# Patient Record
Sex: MALE | Race: WHITE | Employment: OTHER | URBAN - METROPOLITAN AREA
[De-identification: names, ages, dates, MRNs, and addresses within clinical notes are randomized per-mention and may not be internally consistent; named-entity substitution may affect disease eponyms.]

---

## 2017-01-04 ENCOUNTER — NURSE ONLY (OUTPATIENT)
Dept: INTERNAL MEDICINE CLINIC | Facility: CLINIC | Age: 67
End: 2017-01-04

## 2017-01-04 DIAGNOSIS — Z00.00 LABORATORY EXAMINATION ORDERED AS PART OF A ROUTINE GENERAL MEDICAL EXAMINATION: Primary | ICD-10-CM

## 2017-01-04 LAB
APPEARANCE: CLEAR
BILIRUBIN: NEGATIVE
GLUCOSE (URINE DIPSTICK): NEGATIVE MG/DL
KETONES (URINE DIPSTICK): NEGATIVE MG/DL
LEUKOCYTES: NEGATIVE
MULTISTIX LOT#: NORMAL NUMERIC
NITRITE, URINE: NEGATIVE
OCCULT BLOOD: NEGATIVE
PH, URINE: 7 (ref 4.5–8)
PROTEIN (URINE DIPSTICK): NEGATIVE MG/DL
SPECIFIC GRAVITY: 1.02 (ref 1–1.03)
URINE-COLOR: YELLOW
UROBILINOGEN,SEMI-QN: 0.2 MG/DL (ref 0–1.9)

## 2017-01-04 PROCEDURE — 94010 BREATHING CAPACITY TEST: CPT | Performed by: INTERNAL MEDICINE

## 2017-01-04 PROCEDURE — 81003 URINALYSIS AUTO W/O SCOPE: CPT | Performed by: INTERNAL MEDICINE

## 2017-01-04 PROCEDURE — 93000 ELECTROCARDIOGRAM COMPLETE: CPT | Performed by: INTERNAL MEDICINE

## 2017-01-04 PROCEDURE — 99173 VISUAL ACUITY SCREEN: CPT | Performed by: INTERNAL MEDICINE

## 2017-01-04 PROCEDURE — 92551 PURE TONE HEARING TEST AIR: CPT | Performed by: INTERNAL MEDICINE

## 2017-01-04 NOTE — PROGRESS NOTES
*LOOKIN' BODY RESULTS    YES: X      NO:       REASON TEST NOT PERFORMED:        HEIGHT: 5'6.5   WEIGHT: 158  _____________________________________________________________________________  *VENIPUNCTURE    YES:  X     NO:        REASON VENIPUNCTURE NOT PER

## 2017-01-17 NOTE — PROGRESS NOTES
HEALTH MAINTENANCE:        Immunization History  Administered            Date(s) Administered    Influenza             10/21/2008      Influenza Vaccine, No Preserv, 3YR +                          12/09/2016      TDAP                  01/25/2007      Dallin   URINE-COLOR YELLOW Yellow Final 01/04/2017  9:40 AM Unknown     Multistix Lot# [id number] Numeric Final 01/04/2017  9:40 AM Unknown     Multistix Expiration Date 6/2017 Date Final 01/04/2017  9:40 AM Unknown           ______________________________________

## 2017-01-18 ENCOUNTER — OFFICE VISIT (OUTPATIENT)
Dept: INTERNAL MEDICINE CLINIC | Facility: CLINIC | Age: 67
End: 2017-01-18

## 2017-01-18 VITALS
HEART RATE: 60 BPM | RESPIRATION RATE: 12 BRPM | DIASTOLIC BLOOD PRESSURE: 82 MMHG | SYSTOLIC BLOOD PRESSURE: 130 MMHG | HEIGHT: 66.5 IN | WEIGHT: 158 LBS | BODY MASS INDEX: 25.09 KG/M2 | OXYGEN SATURATION: 98 % | TEMPERATURE: 98 F

## 2017-01-18 DIAGNOSIS — I72.0 ANEURYSM OF CAROTID ARTERY (HCC): ICD-10-CM

## 2017-01-18 DIAGNOSIS — I51.5 CARDIAC CALCIFICATION (HCC): ICD-10-CM

## 2017-01-18 DIAGNOSIS — Z00.00 ROUTINE GENERAL MEDICAL EXAMINATION AT A HEALTH CARE FACILITY: Primary | ICD-10-CM

## 2017-01-18 DIAGNOSIS — I72.9 ANEURYSM (HCC): ICD-10-CM

## 2017-01-18 DIAGNOSIS — E78.5 ELEVATED LIPIDS: ICD-10-CM

## 2017-01-18 DIAGNOSIS — D12.6 ADENOMATOUS POLYP OF COLON, UNSPECIFIED PART OF COLON: ICD-10-CM

## 2017-01-18 DIAGNOSIS — E83.110 HEREDITARY HEMOCHROMATOSIS (HCC): ICD-10-CM

## 2017-01-18 DIAGNOSIS — K82.4 GALLBLADDER POLYP: ICD-10-CM

## 2017-01-18 DIAGNOSIS — E55.9 VITAMIN D DEFICIENCY: ICD-10-CM

## 2017-01-18 DIAGNOSIS — I77.71 CAROTID ARTERY DISSECTION (HCC): ICD-10-CM

## 2017-01-18 DIAGNOSIS — N40.0 BENIGN NON-NODULAR PROSTATIC HYPERPLASIA, PRESENCE OF LOWER URINARY TRACT SYMPTOMS UNSPECIFIED: ICD-10-CM

## 2017-01-18 DIAGNOSIS — K80.20 GALLBLADDER STONE WITHOUT CHOLECYSTITIS OR OBSTRUCTION: ICD-10-CM

## 2017-01-18 DIAGNOSIS — Z56.6 STRESS AT WORK: ICD-10-CM

## 2017-01-18 DIAGNOSIS — J30.1 HAYFEVER: ICD-10-CM

## 2017-01-18 DIAGNOSIS — I10 ESSENTIAL HYPERTENSION: ICD-10-CM

## 2017-01-18 DIAGNOSIS — R73.9 HYPERGLYCEMIA: ICD-10-CM

## 2017-01-18 DIAGNOSIS — E04.1 THYROID NODULE: ICD-10-CM

## 2017-01-18 DIAGNOSIS — K57.90 DIVERTICULOSIS OF INTESTINE WITHOUT BLEEDING, UNSPECIFIED INTESTINAL TRACT LOCATION: ICD-10-CM

## 2017-01-18 PROCEDURE — 99396 PREV VISIT EST AGE 40-64: CPT | Performed by: INTERNAL MEDICINE

## 2017-01-18 SDOH — HEALTH STABILITY - MENTAL HEALTH: OTHER PHYSICAL AND MENTAL STRAIN RELATED TO WORK: Z56.6

## 2017-01-18 NOTE — PROGRESS NOTES
HPI:    Patient ID: Cirilo Perez is a 61year old male. HPI DEAR Alcon Prince    IT WAS NICE SEEING YOU FOR YOUR WELLNESS VISIT ON 1/18/2017            Review of Systems   HPI:    Patient ID: Cirilo Perez is a 61year old male.     History of Present Illness Have you had any memory issues?: 0-No    Fall/Risk Scoring: 3          PHQ-4: Over the LAST 2 WEEKS               Little interest or pleasure in doing things (over the last two weeks)?: Not at all    Feeling down, depressed, or hopeless (over the last Urinary stream normal.  Denies history of kidney stones. Nocturia times:X0     NO-   ED   -    Musculoskeletal: Negative for myalgias, back pain, joint swelling, and arthralgias.   chronic back issues have been excellent for at least several years had seen Height: 66.5\"   Weight: 158 lb   SpO2: 98%     Body mass index is 25.12 kg/(m^2).     Active Problems:  Patient Active Problem List:     HTN (hypertension)     Hemochromatosis     Other and unspecified hyperlipidemia     BPH (benign prostatic hypertrophy 9/28/2011   • Left atrial enlargement 3/3/2011   • Thyroid nodule 12/27/2011   • Mitral insufficiency 3/10/2009   • Hayfever    • History of chickenpox    • Sensorineural hearing loss 1/25/2007     low grade   • High blood pressure    • High cholesterol Alcohol Use: Yes  0.0 oz/week    0 Standard drinks or equivalent per week         Comment: BEER   7  CANS  WEEKLY    Drug Use: No    Sexual Activity: No   No  Other Topics Concern    Caffeine Concern No    Comment: 6 decaf cups a day    Stress Concern Yes normal.  Cognitive testing normal.    Skin: Cannot rule out actinic keratosis shoulder area . skin is warm and dry. No rash noted. No erythema. No pallor. No obvious worrisome skin lesions. Psychiatric: Normal mood and affect. Appropriate.   Overall bett should get Prevnar 13 for pneumonia coverage and repeat 1 year later with Pneumovax–23       4.  carotid dissection early November 2016 presented with significant neurologic findings a facial weakness. Emergent stents ×3 placed in left carotid artery.   Ex but asymptomatic PSA variation is normal at present time. 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 Katie Parson. Last checked fall 2015 believe he checks you every 2-3 years. #14 work stress all consuming but managing better at the present time.   Remember colon, unspecified part of colon  Vitamin d deficiency  Gallbladder stone without cholecystitis or obstruction  Diverticulosis of intestine without bleeding, unspecified intestinal tract location  Thyroid nodule  Hayfever    No orders of the defined types

## 2017-01-27 ENCOUNTER — TELEPHONE (OUTPATIENT)
Dept: INTERNAL MEDICINE CLINIC | Facility: CLINIC | Age: 67
End: 2017-01-27

## 2017-01-27 RX ORDER — ATORVASTATIN CALCIUM 40 MG/1
40 TABLET, FILM COATED ORAL NIGHTLY
Qty: 90 TABLET | Refills: 3 | Status: SHIPPED | OUTPATIENT
Start: 2017-01-27 | End: 2017-03-06

## 2017-01-27 NOTE — TELEPHONE ENCOUNTER
Patient states he needs refill on Atorvastatin due to dose increase from 20mg to 40mg. However [facility] in [place] won't refill because they have not received order reflecting dose increase.

## 2017-02-03 ENCOUNTER — MED REC SCAN ONLY (OUTPATIENT)
Dept: INTERNAL MEDICINE CLINIC | Facility: CLINIC | Age: 67
End: 2017-02-03

## 2017-02-22 ENCOUNTER — TELEPHONE (OUTPATIENT)
Dept: NEUROLOGY | Facility: CLINIC | Age: 67
End: 2017-02-22

## 2017-02-23 ENCOUNTER — TELEPHONE (OUTPATIENT)
Dept: INTERNAL MEDICINE CLINIC | Facility: CLINIC | Age: 67
End: 2017-02-23

## 2017-02-23 NOTE — TELEPHONE ENCOUNTER
William Miles was told at his physical on 1/18 that he should have a f/u ultrasound of this thyroid through Dr. Corazon Galdamez. He also has an order for a CAT scan of his neck from the doctor who previously placed a stent.   William Miles wants to know if he can have his thyroid sca

## 2017-02-28 ENCOUNTER — TELEPHONE (OUTPATIENT)
Dept: NEUROLOGY | Facility: CLINIC | Age: 67
End: 2017-02-28

## 2017-02-28 DIAGNOSIS — G45.1 CAROTID ARTERY SYNDROME: ICD-10-CM

## 2017-02-28 DIAGNOSIS — I77.71 CAROTID ARTERY DISSECTION (HCC): Primary | ICD-10-CM

## 2017-02-28 NOTE — TELEPHONE ENCOUNTER
Patient calling, he is due for CTA follow up and no orders in system. Per last OV note, patient to have CTA head and neck done late Jan/early FEB    Order placed per protocol.  Reminded patient to schedule follow up with Dr. Dominga Antunez

## 2017-03-01 ENCOUNTER — TELEPHONE (OUTPATIENT)
Dept: NEUROLOGY | Facility: CLINIC | Age: 67
End: 2017-03-01

## 2017-03-01 NOTE — TELEPHONE ENCOUNTER
Spoke to Ines at [facility] [place] ,[employer] [id number] no Mendel Grand is needed. [employer]#[phone] call time 24:41    Called pt at work his co worker answer left a message with him to call us back. If pt calls please give him message above.

## 2017-03-02 ENCOUNTER — HOSPITAL ENCOUNTER (OUTPATIENT)
Dept: CT IMAGING | Facility: HOSPITAL | Age: 67
Discharge: HOME | End: 2017-03-02
Attending: RADIOLOGY
Payer: COMMERCIAL

## 2017-03-02 DIAGNOSIS — I77.71 CAROTID ARTERY DISSECTION (HCC): ICD-10-CM

## 2017-03-02 DIAGNOSIS — G45.1 CAROTID ARTERY SYNDROME: ICD-10-CM

## 2017-03-02 PROCEDURE — 70498 CT ANGIOGRAPHY NECK: CPT

## 2017-03-02 PROCEDURE — 70496 CT ANGIOGRAPHY HEAD: CPT

## 2017-03-06 ENCOUNTER — OFFICE VISIT (OUTPATIENT)
Dept: SURGERY | Facility: CLINIC | Age: 67
End: 2017-03-06

## 2017-03-06 VITALS — DIASTOLIC BLOOD PRESSURE: 76 MMHG | SYSTOLIC BLOOD PRESSURE: 124 MMHG | HEART RATE: 64 BPM | RESPIRATION RATE: 18 BRPM

## 2017-03-06 DIAGNOSIS — I77.71 CAROTID ARTERY DISSECTION (HCC): Primary | ICD-10-CM

## 2017-03-06 PROCEDURE — 99214 OFFICE O/P EST MOD 30 MIN: CPT | Performed by: RADIOLOGY

## 2017-03-06 NOTE — PROGRESS NOTES
3/6/2017      Dear Tyree Ames,  I had the pleasure of seeing Mortimer Braeden today,3/6/2017   , for a 4 month follow-up status post emergent revascularization of a dissected, occluded left distal cervical internal carotid artery, causing left cerebral ischemia Recurrent UTI 4/6/2010   • BPH (benign prostatic hypertrophy)    • Hyperglycemia    • Mitral regurgitation 4/17/2012     3/9/2011: 2D echo did show mild aterioir lead jet, MR   • Syncope 3/2/2011     vagal episode   • Closed head injury 3/2/2011   • PVC's 11/3/2016    Comment         Drug Use: No          Current Outpatient Prescriptions:  BENAZEPRIL HCL 20 MG Oral Tab TAKE 1 TABLET BY MOUTH EVERY DAY Disp: 30 tablet Rfl: 6   AMLODIPINE BESYLATE 2.5 MG Oral Tab TAKE 1 TABLET BY MOUTH EVERY DAY Disp: 30 tab endarterectomy is again noted.   Neck/Thyroid: no JVD, adenopathy or bruit, trachea midline  Lungs: clear, equal bilaterally, no wheezing  Cardiovascular: regular rate and rhythm without murmur  Skin: warm, dry and intact  Neurological:  Mental status: Norm with annual or biannual carotid Dopplers. I would like us to get him on a unified schedule so he is not getting more examinations then he needs. For the next 3 years, annual Dopplers are recommended. No Follow-up on file.

## 2017-03-06 NOTE — PATIENT INSTRUCTIONS
Refill policies:    • Allow 2 business days for refills; controlled substances may take longer.   • Contact your pharmacy at least 5 days prior to running out of medication and have them send an electronic request or submit request through the “request re your physician has recommended that you have a procedure or additional testing performed. [facility] will contact your insurance carrier to obtain pre-certification or prior authorization.     Unfortunately, SARAH has seen an increas

## 2017-03-06 NOTE — PROGRESS NOTES
Patient following up after completing CTA brain/neck.     Review of Systems:    Hand Dominance: right  General: no symptoms reported  Neuro: no symptoms reported  Head: no symptoms reported  Musculoskeletal: no symptoms reported  Cardiovascular: no symptoms

## 2017-03-15 ENCOUNTER — PRIOR ORIGINAL RECORDS (OUTPATIENT)
Dept: OTHER | Age: 67
End: 2017-03-15

## 2017-03-15 ENCOUNTER — APPOINTMENT (OUTPATIENT)
Dept: LAB | Age: 67
End: 2017-03-15
Attending: INTERNAL MEDICINE
Payer: COMMERCIAL

## 2017-03-15 DIAGNOSIS — E83.110 HEREDITARY HEMOCHROMATOSIS (HCC): ICD-10-CM

## 2017-03-15 LAB
DEPRECATED HBV CORE AB SER IA-ACNC: 16.5 NG/ML (ref 22–322)
ERYTHROCYTE [DISTWIDTH] IN BLOOD BY AUTOMATED COUNT: 12.6 % (ref 11.5–16)
HCT VFR BLD AUTO: 48 % (ref 37–53)
HGB BLD-MCNC: 16.7 G/DL (ref 13–17)
IRON SATURATION: 21 % (ref 13–45)
IRON: 96 UG/DL (ref 45–182)
MCH RBC QN AUTO: 32 PG (ref 27–33.2)
MCHC RBC AUTO-ENTMCNC: 34.8 G/DL (ref 31–37)
MCV RBC AUTO: 92 FL (ref 80–99)
PLATELET # BLD AUTO: 228 10(3)UL (ref 150–450)
RBC # BLD AUTO: 5.22 X10(6)UL (ref 4.3–5.7)
RED CELL DISTRIBUTION WIDTH-SD: 42.5 FL (ref 35.1–46.3)
TOTAL IRON BINDING CAPACITY: 460 UG/DL (ref 298–536)
TRANSFERRIN: 309 MG/DL (ref 200–360)
WBC # BLD AUTO: 5.3 X10(3) UL (ref 4–13)

## 2017-03-15 PROCEDURE — 85027 COMPLETE CBC AUTOMATED: CPT

## 2017-03-15 PROCEDURE — 36415 COLL VENOUS BLD VENIPUNCTURE: CPT

## 2017-03-15 PROCEDURE — 82728 ASSAY OF FERRITIN: CPT

## 2017-03-15 PROCEDURE — 83540 ASSAY OF IRON: CPT

## 2017-03-15 PROCEDURE — 83550 IRON BINDING TEST: CPT

## 2017-05-09 ENCOUNTER — TELEPHONE (OUTPATIENT)
Dept: INTERNAL MEDICINE CLINIC | Facility: CLINIC | Age: 67
End: 2017-05-09

## 2017-05-09 ENCOUNTER — PRIOR ORIGINAL RECORDS (OUTPATIENT)
Dept: OTHER | Age: 67
End: 2017-05-09

## 2017-05-09 NOTE — TELEPHONE ENCOUNTER
Left message for patient. Need location of [facility] in order to fax over requisition for therapeutic phlebotomy.

## 2017-05-10 ENCOUNTER — PRIOR ORIGINAL RECORDS (OUTPATIENT)
Dept: OTHER | Age: 67
End: 2017-05-10

## 2017-05-10 NOTE — TELEPHONE ENCOUNTER
Received call from [address], Daniel Araujo called and said the directions on the faxed order must be more specific than \"per standard\". Does the doctor want every month, every two month? ?  Please call her back at [phone]

## 2017-05-17 LAB
HEMATOCRIT: 48 %
HEMOGLOBIN: 16.7 G/DL
IRON, TOTAL: 96 MCG/DL
PLATELETS: 228 K/UL
RED BLOOD COUNT: 5.22 X 10-6/U
WHITE BLOOD COUNT: 5.3 X 10-3/U

## 2017-05-31 RX ORDER — AMLODIPINE BESYLATE 2.5 MG/1
2.5 TABLET ORAL
Qty: 90 TABLET | Refills: 2 | Status: SHIPPED | OUTPATIENT
Start: 2017-05-31 | End: 2017-12-11

## 2017-05-31 RX ORDER — BENAZEPRIL HYDROCHLORIDE 20 MG/1
20 TABLET ORAL
Qty: 90 TABLET | Refills: 2 | Status: SHIPPED | OUTPATIENT
Start: 2017-05-31 | End: 2017-12-11

## 2017-05-31 NOTE — TELEPHONE ENCOUNTER
Faxed request from [place] [facility] for refill Amlodipine 2.5mg and Benazepril 20mg- 90 day.      Labs done 1/4/17    Last OV 1/2017 CPX

## 2017-06-07 ENCOUNTER — MED REC SCAN ONLY (OUTPATIENT)
Dept: INTERNAL MEDICINE CLINIC | Facility: CLINIC | Age: 67
End: 2017-06-07

## 2017-06-22 RX ORDER — BENAZEPRIL HYDROCHLORIDE 20 MG/1
TABLET ORAL
Qty: 30 TABLET | Refills: 6 | Status: SHIPPED | OUTPATIENT
Start: 2017-06-22 | End: 2017-09-21

## 2017-06-22 RX ORDER — AMLODIPINE BESYLATE 2.5 MG/1
TABLET ORAL
Qty: 30 TABLET | Refills: 6 | Status: SHIPPED | OUTPATIENT
Start: 2017-06-22 | End: 2017-09-21

## 2017-06-27 ENCOUNTER — TELEPHONE (OUTPATIENT)
Dept: INTERNAL MEDICINE CLINIC | Facility: CLINIC | Age: 67
End: 2017-06-27

## 2017-06-27 DIAGNOSIS — E83.119 HEMOCHROMATOSIS, UNSPECIFIED HEMOCHROMATOSIS TYPE: Primary | ICD-10-CM

## 2017-06-27 NOTE — TELEPHONE ENCOUNTER
Please put in lab orders for Iron, Ferritin, and CBC.   Noah Quigley would like to schedule this lab work

## 2017-06-28 ENCOUNTER — LAB ENCOUNTER (OUTPATIENT)
Dept: LAB | Age: 67
End: 2017-06-28
Attending: PHARMACIST
Payer: COMMERCIAL

## 2017-06-28 DIAGNOSIS — E83.119 HEMOCHROMATOSIS, UNSPECIFIED HEMOCHROMATOSIS TYPE: ICD-10-CM

## 2017-06-28 PROCEDURE — 83540 ASSAY OF IRON: CPT

## 2017-06-28 PROCEDURE — 82728 ASSAY OF FERRITIN: CPT

## 2017-06-28 PROCEDURE — 36415 COLL VENOUS BLD VENIPUNCTURE: CPT

## 2017-06-28 PROCEDURE — 83550 IRON BINDING TEST: CPT

## 2017-06-28 PROCEDURE — 85025 COMPLETE CBC W/AUTO DIFF WBC: CPT

## 2017-08-14 ENCOUNTER — TELEPHONE (OUTPATIENT)
Dept: NEUROLOGY | Facility: CLINIC | Age: 67
End: 2017-08-14

## 2017-09-28 ENCOUNTER — LAB ENCOUNTER (OUTPATIENT)
Dept: LAB | Age: 67
End: 2017-09-28
Attending: INTERNAL MEDICINE
Payer: COMMERCIAL

## 2017-09-28 DIAGNOSIS — E83.119 HEMOCHROMATOSIS, UNSPECIFIED HEMOCHROMATOSIS TYPE: ICD-10-CM

## 2017-09-28 LAB
BASOPHILS # BLD AUTO: 0.03 X10(3) UL (ref 0–0.1)
BASOPHILS NFR BLD AUTO: 0.6 %
DEPRECATED HBV CORE AB SER IA-ACNC: 16.9 NG/ML (ref 22–322)
EOSINOPHIL # BLD AUTO: 0.07 X10(3) UL (ref 0–0.3)
EOSINOPHIL NFR BLD AUTO: 1.4 %
ERYTHROCYTE [DISTWIDTH] IN BLOOD BY AUTOMATED COUNT: 13.9 % (ref 11.5–16)
HCT VFR BLD AUTO: 47.1 % (ref 37–53)
HGB BLD-MCNC: 16 G/DL (ref 13–17)
IMMATURE GRANULOCYTE COUNT: 0.01 X10(3) UL (ref 0–1)
IMMATURE GRANULOCYTE RATIO %: 0.2 %
IRON SATURATION: 24 % (ref 13–45)
IRON: 100 UG/DL (ref 45–182)
LYMPHOCYTES # BLD AUTO: 1.55 X10(3) UL (ref 0.9–4)
LYMPHOCYTES NFR BLD AUTO: 30.6 %
MCH RBC QN AUTO: 30.1 PG (ref 27–33.2)
MCHC RBC AUTO-ENTMCNC: 34 G/DL (ref 31–37)
MCV RBC AUTO: 88.7 FL (ref 80–99)
MONOCYTES # BLD AUTO: 0.72 X10(3) UL (ref 0.1–0.6)
MONOCYTES NFR BLD AUTO: 14.2 %
NEUTROPHIL ABS PRELIM: 2.68 X10 (3) UL (ref 1.3–6.7)
NEUTROPHILS # BLD AUTO: 2.68 X10(3) UL (ref 1.3–6.7)
NEUTROPHILS NFR BLD AUTO: 53 %
PLATELET # BLD AUTO: 182 10(3)UL (ref 150–450)
RBC # BLD AUTO: 5.31 X10(6)UL (ref 4.3–5.7)
RED CELL DISTRIBUTION WIDTH-SD: 45.1 FL (ref 35.1–46.3)
TOTAL IRON BINDING CAPACITY: 417 UG/DL (ref 298–536)
TRANSFERRIN: 280 MG/DL (ref 200–360)
WBC # BLD AUTO: 5.1 X10(3) UL (ref 4–13)

## 2017-09-28 PROCEDURE — 83550 IRON BINDING TEST: CPT

## 2017-09-28 PROCEDURE — 85025 COMPLETE CBC W/AUTO DIFF WBC: CPT

## 2017-09-28 PROCEDURE — 83540 ASSAY OF IRON: CPT

## 2017-09-28 PROCEDURE — 36415 COLL VENOUS BLD VENIPUNCTURE: CPT

## 2017-09-28 PROCEDURE — 82728 ASSAY OF FERRITIN: CPT

## 2017-09-29 NOTE — PROGRESS NOTES
Discussed with patient. Phlebotomy in November probably twice-year-old be enough or less so. Work order already there. We can recheck then 3 months after that I will be seeing him in January.

## 2017-10-23 ENCOUNTER — OFFICE VISIT (OUTPATIENT)
Dept: INTERNAL MEDICINE CLINIC | Facility: CLINIC | Age: 67
End: 2017-10-23

## 2017-10-23 VITALS
HEIGHT: 66.5 IN | DIASTOLIC BLOOD PRESSURE: 60 MMHG | RESPIRATION RATE: 12 BRPM | SYSTOLIC BLOOD PRESSURE: 110 MMHG | OXYGEN SATURATION: 97 % | WEIGHT: 155 LBS | TEMPERATURE: 100 F | BODY MASS INDEX: 24.62 KG/M2 | HEART RATE: 68 BPM

## 2017-10-23 DIAGNOSIS — J02.9 PHARYNGITIS, UNSPECIFIED ETIOLOGY: Primary | ICD-10-CM

## 2017-10-23 PROCEDURE — 99213 OFFICE O/P EST LOW 20 MIN: CPT | Performed by: INTERNAL MEDICINE

## 2017-10-23 PROCEDURE — 87880 STREP A ASSAY W/OPTIC: CPT | Performed by: INTERNAL MEDICINE

## 2017-10-23 RX ORDER — AZITHROMYCIN 250 MG/1
TABLET, FILM COATED ORAL
Qty: 6 TABLET | Refills: 0 | Status: SHIPPED | OUTPATIENT
Start: 2017-10-23 | End: 2017-12-11 | Stop reason: ALTCHOICE

## 2017-10-23 NOTE — PROGRESS NOTES
Subjective: 2 day history sore throat fever over 101. Without shaking chills, vomiting, myalgias arthralgias. No known exposure history. Constitutional eating fine.   Scheduled to stop working January 31, 2018 I will see him for physical shor

## 2017-10-24 ENCOUNTER — TELEPHONE (OUTPATIENT)
Dept: INTERNAL MEDICINE CLINIC | Facility: CLINIC | Age: 67
End: 2017-10-24

## 2017-10-24 RX ORDER — AMLODIPINE BESYLATE 2.5 MG/1
2.5 TABLET ORAL DAILY
Qty: 90 TABLET | Refills: 3 | Status: SHIPPED | OUTPATIENT
Start: 2017-10-24 | End: 2018-09-25

## 2017-10-24 RX ORDER — ATORVASTATIN CALCIUM 40 MG/1
40 TABLET, FILM COATED ORAL NIGHTLY
Qty: 90 TABLET | Refills: 3 | Status: SHIPPED | OUTPATIENT
Start: 2017-10-24 | End: 2017-12-11

## 2017-10-24 RX ORDER — BENAZEPRIL HYDROCHLORIDE 20 MG/1
20 TABLET ORAL DAILY
Qty: 90 TABLET | Refills: 3 | Status: SHIPPED | OUTPATIENT
Start: 2017-10-24 | End: 2018-09-25

## 2017-10-24 NOTE — TELEPHONE ENCOUNTER
Ru Carbajal would like to speak to marisa to give an update on his condition. Dr. Josiah Martínez told him to call.  Please call

## 2017-10-24 NOTE — TELEPHONE ENCOUNTER
Spoke with patient. States feeling somewhat better. Did not  prescription. Taking Tylenol and gargling with warm salt water and pushing fluids.

## 2017-11-09 ENCOUNTER — HOSPITAL ENCOUNTER (OUTPATIENT)
Dept: ULTRASOUND IMAGING | Age: 67
Discharge: HOME | End: 2017-11-09
Attending: INTERNAL MEDICINE
Payer: MEDICARE

## 2017-11-09 DIAGNOSIS — I72.3 ANEURYSM OF ILIAC ARTERY (HCC): ICD-10-CM

## 2017-11-09 DIAGNOSIS — I65.29 STENOSIS OF CAROTID ARTERY, UNSPECIFIED LATERALITY: ICD-10-CM

## 2017-11-09 DIAGNOSIS — I72.3 ILIAC ANEURYSM (HCC): ICD-10-CM

## 2017-11-09 PROCEDURE — 93880 EXTRACRANIAL BILAT STUDY: CPT | Performed by: INTERNAL MEDICINE

## 2017-11-09 PROCEDURE — 76770 US EXAM ABDO BACK WALL COMP: CPT | Performed by: INTERNAL MEDICINE

## 2017-11-14 ENCOUNTER — PRIOR ORIGINAL RECORDS (OUTPATIENT)
Dept: OTHER | Age: 67
End: 2017-11-14

## 2017-11-20 ENCOUNTER — TELEPHONE (OUTPATIENT)
Dept: SURGERY | Facility: CLINIC | Age: 67
End: 2017-11-20

## 2017-11-20 DIAGNOSIS — I77.71 CAROTID ARTERY DISSECTION (HCC): Primary | ICD-10-CM

## 2017-12-11 ENCOUNTER — OFFICE VISIT (OUTPATIENT)
Dept: SURGERY | Facility: CLINIC | Age: 67
End: 2017-12-11

## 2017-12-11 VITALS
DIASTOLIC BLOOD PRESSURE: 80 MMHG | WEIGHT: 155 LBS | SYSTOLIC BLOOD PRESSURE: 124 MMHG | HEIGHT: 67 IN | HEART RATE: 68 BPM | BODY MASS INDEX: 24.33 KG/M2

## 2017-12-11 DIAGNOSIS — I77.71 CAROTID ARTERY DISSECTION (HCC): Primary | ICD-10-CM

## 2017-12-11 PROCEDURE — 99214 OFFICE O/P EST MOD 30 MIN: CPT | Performed by: RADIOLOGY

## 2017-12-11 NOTE — PATIENT INSTRUCTIONS
Refill policies:    • Allow 2-3 business days for refills; controlled substances may take longer.   • Contact your pharmacy at least 5 days prior to running out of medication and have them send an electronic request or submit request through the [facility] have a procedure or additional testing performed. [facility]) will contact your insurance carrier to obtain pre-certification or prior authorization.     Unfortunately, SARAH has seen an increase in denial of payment even though the p

## 2017-12-11 NOTE — PROGRESS NOTES
LOV 3/6/17    Review of Systems:    Hand Dominance: right  General: no symptoms reported  Neuro: no symptoms reported  Head: no symptoms reported  Musculoskeletal: no symptoms reported  Cardiovascular: no symptoms reported  Gastrointestinal: no symptoms re

## 2017-12-11 NOTE — PROGRESS NOTES
12/11/2017      Dear Tyree Ames,  I had the pleasure of seeing Mortimer Braeden today,12/11/2017   , for a 1 year follow-up after emergent revascularization of a dissected occluded left distal cervical internal carotid artery causing left cerebral ischemia in e dilatation , B/L iliacs; questionable   • BPH (benign prostatic hyperplasia)    • BPH (benign prostatic hypertrophy)    • Closed head injury 3/2/2011   • Gallbladder stone without cholecystitis or obstruction 9/28/2011   • Hayfever    • Hemochromatosis and 2 molds (benign)  1957/1958: T&A     Drug use: No          Current Outpatient Prescriptions: AmLODIPine Besylate 2.5 MG Oral Tab Take 1 tablet (2.5 mg total) by mouth daily.  Disp: 90 tablet Rfl: 3   Benazepril HCl 20 MG Oral Tab Take 1 tablet (20 mg t recent and remote memory are intact.   HEENT: Head normal. Ears normal. Eyes normal. Nose normal. Throat normal.  Neck/Thyroid: no JVD, adenopathy or bruit, trachea midline  Lungs: clear, equal bilaterally, no wheezing  Cardiovascular: regular rate and rhyt

## 2017-12-14 ENCOUNTER — MED REC SCAN ONLY (OUTPATIENT)
Dept: INTERNAL MEDICINE CLINIC | Facility: CLINIC | Age: 67
End: 2017-12-14

## 2018-01-15 ENCOUNTER — NURSE ONLY (OUTPATIENT)
Dept: INTERNAL MEDICINE CLINIC | Facility: CLINIC | Age: 68
End: 2018-01-15

## 2018-01-15 ENCOUNTER — PRIOR ORIGINAL RECORDS (OUTPATIENT)
Dept: OTHER | Age: 68
End: 2018-01-15

## 2018-01-15 DIAGNOSIS — Z00.00 LABORATORY EXAMINATION ORDERED AS PART OF A ROUTINE GENERAL MEDICAL EXAMINATION: Primary | ICD-10-CM

## 2018-01-15 LAB
BILIRUB UR QL STRIP.AUTO: NEGATIVE
CLARITY UR REFRACT.AUTO: CLEAR
COLOR UR AUTO: YELLOW
GLUCOSE UR STRIP.AUTO-MCNC: NEGATIVE MG/DL
KETONES UR STRIP.AUTO-MCNC: NEGATIVE MG/DL
LEUKOCYTE ESTERASE UR QL STRIP.AUTO: NEGATIVE
NITRITE UR QL STRIP.AUTO: NEGATIVE
PH UR STRIP.AUTO: 6 [PH] (ref 4.5–8)
PROT UR STRIP.AUTO-MCNC: NEGATIVE MG/DL
RBC UR QL AUTO: NEGATIVE
SP GR UR STRIP.AUTO: 1.01 (ref 1–1.03)
UROBILINOGEN UR STRIP.AUTO-MCNC: <2 MG/DL

## 2018-01-15 PROCEDURE — 81003 URINALYSIS AUTO W/O SCOPE: CPT | Performed by: INTERNAL MEDICINE

## 2018-01-15 PROCEDURE — 93000 ELECTROCARDIOGRAM COMPLETE: CPT | Performed by: INTERNAL MEDICINE

## 2018-01-15 PROCEDURE — 99173 VISUAL ACUITY SCREEN: CPT | Performed by: INTERNAL MEDICINE

## 2018-01-15 PROCEDURE — 94010 BREATHING CAPACITY TEST: CPT | Performed by: INTERNAL MEDICINE

## 2018-01-15 PROCEDURE — 92551 PURE TONE HEARING TEST AIR: CPT | Performed by: INTERNAL MEDICINE

## 2018-01-15 NOTE — PROGRESS NOTES
*LOOKIN' BODY RESULTS    YES: X      NO:       REASON TEST NOT PERFORMED:        HEIGHT: 5'6.3   WEIGHT: 158  _____________________________________________________________________________  *VENIPUNCTURE    YES:  X     NO:        REASON VENIPUNCTURE NOT PER

## 2018-01-22 ENCOUNTER — MED REC SCAN ONLY (OUTPATIENT)
Dept: INTERNAL MEDICINE CLINIC | Facility: CLINIC | Age: 68
End: 2018-01-22

## 2018-01-22 NOTE — PROGRESS NOTES
HEALTH MAINTENANCE:        Immunization History  Administered            Date(s) Administered    Influenza             10/21/2008      Influenza Vaccine, No Preserv, 3YR +                          12/09/2016      TDAP                  01/25/2007      Dallin

## 2018-01-23 ENCOUNTER — OFFICE VISIT (OUTPATIENT)
Dept: INTERNAL MEDICINE CLINIC | Facility: CLINIC | Age: 68
End: 2018-01-23

## 2018-01-23 VITALS
OXYGEN SATURATION: 98 % | HEART RATE: 58 BPM | TEMPERATURE: 98 F | DIASTOLIC BLOOD PRESSURE: 70 MMHG | HEIGHT: 66.3 IN | RESPIRATION RATE: 12 BRPM | WEIGHT: 158 LBS | SYSTOLIC BLOOD PRESSURE: 118 MMHG | BODY MASS INDEX: 25.39 KG/M2

## 2018-01-23 DIAGNOSIS — Z56.6 STRESS AT WORK: ICD-10-CM

## 2018-01-23 DIAGNOSIS — I10 ESSENTIAL HYPERTENSION: ICD-10-CM

## 2018-01-23 DIAGNOSIS — R73.9 HYPERGLYCEMIA: ICD-10-CM

## 2018-01-23 DIAGNOSIS — D12.6 ADENOMATOUS POLYP OF COLON, UNSPECIFIED PART OF COLON: ICD-10-CM

## 2018-01-23 DIAGNOSIS — Z00.00 ROUTINE GENERAL MEDICAL EXAMINATION AT A HEALTH CARE FACILITY: ICD-10-CM

## 2018-01-23 DIAGNOSIS — E78.5 ELEVATED LIPIDS: ICD-10-CM

## 2018-01-23 DIAGNOSIS — Z23 VACCINE FOR STREPTOCOCCUS PNEUMONIAE AND INFLUENZA: ICD-10-CM

## 2018-01-23 DIAGNOSIS — K80.20 GALLBLADDER STONE WITHOUT CHOLECYSTITIS OR OBSTRUCTION: ICD-10-CM

## 2018-01-23 DIAGNOSIS — E04.1 THYROID NODULE: ICD-10-CM

## 2018-01-23 DIAGNOSIS — I51.5 CARDIAC CALCIFICATION (HCC): ICD-10-CM

## 2018-01-23 DIAGNOSIS — K57.90 DIVERTICULOSIS OF INTESTINE WITHOUT BLEEDING, UNSPECIFIED INTESTINAL TRACT LOCATION: ICD-10-CM

## 2018-01-23 DIAGNOSIS — E83.119 HEMOCHROMATOSIS, UNSPECIFIED HEMOCHROMATOSIS TYPE: ICD-10-CM

## 2018-01-23 DIAGNOSIS — Z23 NEED FOR VACCINATION: Primary | ICD-10-CM

## 2018-01-23 DIAGNOSIS — I77.71 CAROTID ARTERY DISSECTION (HCC): ICD-10-CM

## 2018-01-23 DIAGNOSIS — I72.9 ANEURYSM (HCC): ICD-10-CM

## 2018-01-23 PROCEDURE — G0402 INITIAL PREVENTIVE EXAM: HCPCS | Performed by: INTERNAL MEDICINE

## 2018-01-23 PROCEDURE — 90653 IIV ADJUVANT VACCINE IM: CPT | Performed by: INTERNAL MEDICINE

## 2018-01-23 PROCEDURE — 90670 PCV13 VACCINE IM: CPT | Performed by: INTERNAL MEDICINE

## 2018-01-23 PROCEDURE — G0008 ADMIN INFLUENZA VIRUS VAC: HCPCS | Performed by: INTERNAL MEDICINE

## 2018-01-23 PROCEDURE — G0009 ADMIN PNEUMOCOCCAL VACCINE: HCPCS | Performed by: INTERNAL MEDICINE

## 2018-01-23 SDOH — HEALTH STABILITY - MENTAL HEALTH: OTHER PHYSICAL AND MENTAL STRAIN RELATED TO WORK: Z56.6

## 2018-01-23 NOTE — PROGRESS NOTES
HPI:    Patient ID: Bridget Watson is a 74year old male. HPI DEAR Mikel Reederely    IT WAS NICE SEEING YOU FOR YOUR WELLNESS VISIT ON 1/23/2018            Review of Systems    HPI:    Patient ID: Bridget Watson is a 74year old male.     History of Present Illnes Do you have a healthcare power of ?: No    Do you have a living will?: No     Please go to \"Cognitive Assessment\" under Medicare Assessment section in Charting, test patient and document.     Then, refresh your progress note to see your input h radiology. Gastrointestinal: Negative for nausea, vomiting, abdominal pain, diarrhea, blood in stool and abdominal distention. Denies GERD. No current liver disorder.   Colon polyps colonoscopy 2013 repeat end of 2018 recommended has access to gastroent dissection [facility])     Essential hypertension      Past Medical History:  Past Medical History:   Diagnosis Date   • Abnormal EKG 4/17/2012   • Abnormal finding 4/17/2012    Abnormal UFCT   • Aneurysm ([address]     4/17/2012: iliac artery; 7/16/2009: Peripheral 11/3/2016: IR STENT      Comment:    3/1993: OTHER SURGICAL HISTORY      Comment: removal of 1 cyst and 2 moles (benign)  1991: OTHER SURGICAL HISTORY      Comment: liver bx  12/27/2011: OTHER SURGICAL HISTORY      Comment: FNA, thyroid nodules  3/1993: atraumatic. Hearing and tympanic membranes normal.  Nose normal. Oropharynx is clear and moist. Dentition adequate. Failed bilateral 4000 frequency 40 dB seems to hear fine     Eyes: Conjunctivae and EOM are normal. PERRLA. No scleral icterus.    Visual Ac liver.  CRP remains low reflecting low inflammation and no evidence of unstable plaque by testing    Currently on fish oil but omega 6 omega-3 ratios remain high. Work on fish shellfish. In addition switch over to [facility] 2000 mg a day.   Increas please repeat. #12. Hypertension doing quite well. On current meds. #13 known positive coronary calcium score would like to repeat this and if it is elevated I may suggest CTA coronary arteries.       14 other laboratory data good cognitive func type  Hyperglycemia  Stress at work  Essential hypertension  Elevated lipids      Orders Placed This Encounter      Fluad 0.5 ml  65 years and older (93758)    Meds This Visit:  No prescriptions requested or ordered in this encounter    Imaging & Referrals

## 2018-01-24 ENCOUNTER — TELEPHONE (OUTPATIENT)
Dept: INTERNAL MEDICINE CLINIC | Facility: CLINIC | Age: 68
End: 2018-01-24

## 2018-01-24 DIAGNOSIS — I51.5 CARDIAC CALCIFICATION (HCC): Primary | ICD-10-CM

## 2018-01-24 DIAGNOSIS — E78.5 ELEVATED LIPIDS: ICD-10-CM

## 2018-01-24 DIAGNOSIS — I10 ESSENTIAL HYPERTENSION: ICD-10-CM

## 2018-01-24 NOTE — TELEPHONE ENCOUNTER
Per PCP order [facility] and pt due for Td at pharmacy. PC regarding above orders and to notify us after you received Td at the pharmacy.

## 2018-02-20 ENCOUNTER — LAB ENCOUNTER (OUTPATIENT)
Dept: LAB | Age: 68
End: 2018-02-20
Attending: INTERNAL MEDICINE
Payer: MEDICARE

## 2018-02-20 DIAGNOSIS — D12.6 ADENOMATOUS POLYP OF COLON, UNSPECIFIED PART OF COLON: ICD-10-CM

## 2018-02-20 DIAGNOSIS — Z56.6 STRESS AT WORK: ICD-10-CM

## 2018-02-20 DIAGNOSIS — E83.119 HEMOCHROMATOSIS, UNSPECIFIED HEMOCHROMATOSIS TYPE: ICD-10-CM

## 2018-02-20 DIAGNOSIS — K80.20 GALLBLADDER STONE WITHOUT CHOLECYSTITIS OR OBSTRUCTION: ICD-10-CM

## 2018-02-20 DIAGNOSIS — I77.71 CAROTID ARTERY DISSECTION (HCC): ICD-10-CM

## 2018-02-20 DIAGNOSIS — Z00.00 ROUTINE GENERAL MEDICAL EXAMINATION AT A HEALTH CARE FACILITY: ICD-10-CM

## 2018-02-20 DIAGNOSIS — E78.5 ELEVATED LIPIDS: ICD-10-CM

## 2018-02-20 DIAGNOSIS — K57.90 DIVERTICULOSIS OF INTESTINE WITHOUT BLEEDING, UNSPECIFIED INTESTINAL TRACT LOCATION: ICD-10-CM

## 2018-02-20 DIAGNOSIS — Z23 NEED FOR VACCINATION: ICD-10-CM

## 2018-02-20 DIAGNOSIS — I51.5 CARDIAC CALCIFICATION (HCC): ICD-10-CM

## 2018-02-20 DIAGNOSIS — R73.9 HYPERGLYCEMIA: ICD-10-CM

## 2018-02-20 DIAGNOSIS — I72.9 ANEURYSM (HCC): ICD-10-CM

## 2018-02-20 DIAGNOSIS — E04.1 THYROID NODULE: ICD-10-CM

## 2018-02-20 DIAGNOSIS — I10 ESSENTIAL HYPERTENSION: ICD-10-CM

## 2018-02-20 LAB
DEPRECATED HBV CORE AB SER IA-ACNC: 16.8 NG/ML (ref 22–322)
IRON SATURATION: 11 % (ref 13–45)
IRON: 45 UG/DL (ref 45–182)
TOTAL IRON BINDING CAPACITY: 423 UG/DL (ref 298–536)
TRANSFERRIN: 284 MG/DL (ref 200–360)

## 2018-02-20 PROCEDURE — 83540 ASSAY OF IRON: CPT

## 2018-02-20 PROCEDURE — 82728 ASSAY OF FERRITIN: CPT

## 2018-02-20 PROCEDURE — 83550 IRON BINDING TEST: CPT

## 2018-02-20 SDOH — HEALTH STABILITY - MENTAL HEALTH: OTHER PHYSICAL AND MENTAL STRAIN RELATED TO WORK: Z56.6

## 2018-02-21 ENCOUNTER — TELEPHONE (OUTPATIENT)
Dept: INTERNAL MEDICINE CLINIC | Facility: CLINIC | Age: 68
End: 2018-02-21

## 2018-02-21 DIAGNOSIS — E83.119 HEMOCHROMATOSIS, UNSPECIFIED HEMOCHROMATOSIS TYPE: Primary | ICD-10-CM

## 2018-02-21 NOTE — TELEPHONE ENCOUNTER
----- Message from Burak Whittington MD sent at 2/20/2018  4:54 PM CST -----  Stable repeat 3-4 months no specific phlebotomy at present

## 2018-04-03 ENCOUNTER — TELEPHONE (OUTPATIENT)
Dept: INTERNAL MEDICINE CLINIC | Facility: CLINIC | Age: 68
End: 2018-04-03

## 2018-04-03 NOTE — TELEPHONE ENCOUNTER
Patient received a bill from [facility] for his annual physical on 1/23/18. (FYI patient is new to Medicare as of October 2017)  Please investigate and call patient or his wife Nicolasa Ruggiero.

## 2018-05-16 ENCOUNTER — PRIOR ORIGINAL RECORDS (OUTPATIENT)
Dept: OTHER | Age: 68
End: 2018-05-16

## 2018-05-31 ENCOUNTER — MED REC SCAN ONLY (OUTPATIENT)
Dept: INTERNAL MEDICINE CLINIC | Facility: CLINIC | Age: 68
End: 2018-05-31

## 2018-05-31 PROBLEM — K13.0 LIP LESION: Status: ACTIVE | Noted: 2018-05-31

## 2018-05-31 PROBLEM — L82.1 OTHER SEBORRHEIC KERATOSIS: Status: ACTIVE | Noted: 2018-05-31

## 2018-05-31 PROBLEM — D48.5 NEOPLASM OF UNCERTAIN BEHAVIOR OF SKIN: Status: ACTIVE | Noted: 2018-05-31

## 2018-06-18 LAB
ALBUMIN: 4.7 G/DL
ALKALINE PHOSPHATATE(ALK PHOS): 57 IU/L
APOLIPOPROTEIN(B): 55 MG/DL
BILIRUBIN TOTAL: 0.7 MG/DL
BUN: 19 MG/DL
CALCIUM: 10 MG/DL
CHLORIDE: 98 MEQ/L
CHOLESTEROL, TOTAL: 131 MG/DL
CREATININE, SERUM: 0.81 MG/DL
GLOBULIN: 2.2 G/DL
GLUCOSE: 108 MG/DL
HDL CHOLESTEROL: 77 MG/DL
HEMATOCRIT: 46.3 %
HEMOGLOBIN A1C: 5.3 %
HEMOGLOBIN: 15.4 G/DL
HSCRP(TYPE Y): <0.2 YES
LDL CHOLESTEROL: 46 MG/DL
PLATELETS: 195 K/UL
POTASSIUM, SERUM: 4.2 MEQ/L
PROTEIN, TOTAL: 6.9 G/DL
RED BLOOD COUNT: 5.1 X 10-6/U
SGOT (AST): 29 IU/L
SGPT (ALT): 27 IU/L
SODIUM: 139 MEQ/L
THYROID STIMULATING HORMONE: 1.28 MLU/L
TRIGLYCERIDES: 41 MG/DL
VITAMIN D 25-OH: 39.1 NG/ML
WHITE BLOOD COUNT: 5.6 X 10-3/U

## 2018-07-25 ENCOUNTER — LAB ENCOUNTER (OUTPATIENT)
Dept: LAB | Facility: HOSPITAL | Age: 68
End: 2018-07-25
Attending: PHARMACIST
Payer: MEDICARE

## 2018-07-25 DIAGNOSIS — E83.119 HEMOCHROMATOSIS, UNSPECIFIED HEMOCHROMATOSIS TYPE: ICD-10-CM

## 2018-07-25 LAB
DEPRECATED HBV CORE AB SER IA-ACNC: 20.6 NG/ML (ref 30–530)
IRON SATURATION: 24 % (ref 20–50)
IRON: 93 UG/DL (ref 45–182)
TOTAL IRON BINDING CAPACITY: 380 UG/DL (ref 240–450)
TRANSFERRIN SERPL-MCNC: 255 MG/DL (ref 200–360)

## 2018-07-25 PROCEDURE — 83550 IRON BINDING TEST: CPT

## 2018-07-25 PROCEDURE — 82728 ASSAY OF FERRITIN: CPT

## 2018-07-25 PROCEDURE — 83540 ASSAY OF IRON: CPT

## 2018-07-25 PROCEDURE — 36415 COLL VENOUS BLD VENIPUNCTURE: CPT

## 2018-09-25 DIAGNOSIS — J30.1 HAYFEVER: ICD-10-CM

## 2018-09-25 DIAGNOSIS — E04.1 THYROID NODULE: ICD-10-CM

## 2018-09-25 DIAGNOSIS — I72.9 ANEURYSM (HCC): ICD-10-CM

## 2018-09-25 DIAGNOSIS — E78.5 ELEVATED LIPIDS: ICD-10-CM

## 2018-09-25 DIAGNOSIS — I77.71 CAROTID ARTERY DISSECTION (HCC): ICD-10-CM

## 2018-09-25 DIAGNOSIS — Z00.00 ROUTINE GENERAL MEDICAL EXAMINATION AT A HEALTH CARE FACILITY: ICD-10-CM

## 2018-09-25 DIAGNOSIS — I10 ESSENTIAL HYPERTENSION: ICD-10-CM

## 2018-09-25 DIAGNOSIS — R73.9 HYPERGLYCEMIA: ICD-10-CM

## 2018-09-25 DIAGNOSIS — D12.6 ADENOMATOUS POLYP OF COLON, UNSPECIFIED PART OF COLON: ICD-10-CM

## 2018-09-25 DIAGNOSIS — Z23 NEED FOR VACCINATION: ICD-10-CM

## 2018-09-25 DIAGNOSIS — K80.20 GALLBLADDER STONE WITHOUT CHOLECYSTITIS OR OBSTRUCTION: ICD-10-CM

## 2018-09-25 DIAGNOSIS — I51.5 CARDIAC CALCIFICATION (HCC): ICD-10-CM

## 2018-09-25 DIAGNOSIS — Z56.6 STRESS AT WORK: ICD-10-CM

## 2018-09-25 DIAGNOSIS — E83.119 HEMOCHROMATOSIS, UNSPECIFIED HEMOCHROMATOSIS TYPE: ICD-10-CM

## 2018-09-25 DIAGNOSIS — K57.90 DIVERTICULOSIS OF INTESTINE WITHOUT BLEEDING, UNSPECIFIED INTESTINAL TRACT LOCATION: ICD-10-CM

## 2018-09-25 RX ORDER — ATORVASTATIN CALCIUM 40 MG/1
TABLET, FILM COATED ORAL
Qty: 90 TABLET | Refills: 3 | Status: SHIPPED | OUTPATIENT
Start: 2018-09-25 | End: 2019-02-27

## 2018-09-25 RX ORDER — AMLODIPINE BESYLATE 2.5 MG/1
TABLET ORAL
Qty: 90 TABLET | Refills: 3 | Status: SHIPPED | OUTPATIENT
Start: 2018-09-25 | End: 2019-08-30

## 2018-09-25 RX ORDER — BENAZEPRIL HYDROCHLORIDE 20 MG/1
TABLET ORAL
Qty: 90 TABLET | Refills: 3 | Status: SHIPPED | OUTPATIENT
Start: 2018-09-25 | End: 2019-08-30

## 2018-09-25 SDOH — HEALTH STABILITY - MENTAL HEALTH: OTHER PHYSICAL AND MENTAL STRAIN RELATED TO WORK: Z56.6

## 2018-11-20 ENCOUNTER — HOSPITAL ENCOUNTER (OUTPATIENT)
Dept: ULTRASOUND IMAGING | Facility: HOSPITAL | Age: 68
Discharge: HOME | End: 2018-11-20
Attending: INTERNAL MEDICINE
Payer: MEDICARE

## 2018-11-20 ENCOUNTER — LAB ENCOUNTER (OUTPATIENT)
Dept: LAB | Facility: HOSPITAL | Age: 68
End: 2018-11-20
Attending: INTERNAL MEDICINE
Payer: MEDICARE

## 2018-11-20 DIAGNOSIS — E83.119 HEMOCHROMATOSIS, UNSPECIFIED HEMOCHROMATOSIS TYPE: ICD-10-CM

## 2018-11-20 DIAGNOSIS — I72.9 ANEURYSM (HCC): ICD-10-CM

## 2018-11-20 DIAGNOSIS — R73.9 HYPERGLYCEMIA: ICD-10-CM

## 2018-11-20 DIAGNOSIS — K80.20 GALLBLADDER STONE WITHOUT CHOLECYSTITIS OR OBSTRUCTION: ICD-10-CM

## 2018-11-20 DIAGNOSIS — D12.6 ADENOMATOUS POLYP OF COLON, UNSPECIFIED PART OF COLON: ICD-10-CM

## 2018-11-20 DIAGNOSIS — I51.5 CARDIAC CALCIFICATION (HCC): ICD-10-CM

## 2018-11-20 DIAGNOSIS — I77.71 CAROTID ARTERY DISSECTION (HCC): ICD-10-CM

## 2018-11-20 DIAGNOSIS — E78.5 ELEVATED LIPIDS: ICD-10-CM

## 2018-11-20 DIAGNOSIS — K57.90 DIVERTICULOSIS OF INTESTINE WITHOUT BLEEDING, UNSPECIFIED INTESTINAL TRACT LOCATION: ICD-10-CM

## 2018-11-20 DIAGNOSIS — Z00.00 ROUTINE GENERAL MEDICAL EXAMINATION AT A HEALTH CARE FACILITY: ICD-10-CM

## 2018-11-20 DIAGNOSIS — Z23 NEED FOR VACCINATION: ICD-10-CM

## 2018-11-20 DIAGNOSIS — I10 ESSENTIAL HYPERTENSION: ICD-10-CM

## 2018-11-20 DIAGNOSIS — Z56.6 STRESS AT WORK: ICD-10-CM

## 2018-11-20 DIAGNOSIS — E04.1 THYROID NODULE: ICD-10-CM

## 2018-11-20 PROCEDURE — 36415 COLL VENOUS BLD VENIPUNCTURE: CPT

## 2018-11-20 PROCEDURE — 83550 IRON BINDING TEST: CPT

## 2018-11-20 PROCEDURE — 82728 ASSAY OF FERRITIN: CPT

## 2018-11-20 PROCEDURE — 76700 US EXAM ABDOM COMPLETE: CPT | Performed by: INTERNAL MEDICINE

## 2018-11-20 PROCEDURE — 83540 ASSAY OF IRON: CPT

## 2018-11-20 SDOH — HEALTH STABILITY - MENTAL HEALTH: OTHER PHYSICAL AND MENTAL STRAIN RELATED TO WORK: Z56.6

## 2018-11-20 NOTE — PROGRESS NOTES
Abdominal ultrasound negative      Saturation up to 74%    Phlebotomy.   Patient will see me within 2 months for physical and will check ferritin and TIBC etc. etc.

## 2018-11-21 ENCOUNTER — TELEPHONE (OUTPATIENT)
Dept: INTERNAL MEDICINE CLINIC | Facility: CLINIC | Age: 68
End: 2018-11-21

## 2018-11-21 DIAGNOSIS — E83.119 HEMOCHROMATOSIS, UNSPECIFIED HEMOCHROMATOSIS TYPE: Primary | ICD-10-CM

## 2018-11-21 NOTE — TELEPHONE ENCOUNTER
Order completed for Phlebotomy and faxed back to [facility], confirmation received. Follow up testing ordered.

## 2018-11-21 NOTE — TELEPHONE ENCOUNTER
Dr. Santos Barnes spoke with patient re: results.  Requested Phlebotomy order form from [address] (now [facility]) where patient had previous Phlebotomy

## 2018-11-21 NOTE — TELEPHONE ENCOUNTER
Notes recorded by Jorge Weiner MD on 11/20/2018 at 11:25 AM CST  Time   To   Proceed  With phelbotomy  Get  On  Phone  __________________________________  Notes recorded by Jorge Weiner MD on 11/20/2018 at 2:37 PM CST  Abdominal ultrasound negati

## 2018-11-26 ENCOUNTER — MYAURORA ACCOUNT LINK (OUTPATIENT)
Dept: OTHER | Age: 68
End: 2018-11-26

## 2018-11-26 ENCOUNTER — MED REC SCAN ONLY (OUTPATIENT)
Dept: INTERNAL MEDICINE CLINIC | Facility: CLINIC | Age: 68
End: 2018-11-26

## 2018-11-26 ENCOUNTER — HOSPITAL ENCOUNTER (OUTPATIENT)
Dept: CARDIOLOGY CLINIC | Facility: HOSPITAL | Age: 68
Discharge: HOME | End: 2018-11-26
Attending: INTERNAL MEDICINE

## 2018-11-26 ENCOUNTER — PRIOR ORIGINAL RECORDS (OUTPATIENT)
Dept: OTHER | Age: 68
End: 2018-11-26

## 2018-11-26 ENCOUNTER — IMMUNIZATION (OUTPATIENT)
Dept: INTERNAL MEDICINE CLINIC | Facility: CLINIC | Age: 68
End: 2018-11-26
Payer: MEDICARE

## 2018-11-26 DIAGNOSIS — I10 HYPERTENSION, UNSPECIFIED TYPE: ICD-10-CM

## 2018-11-26 DIAGNOSIS — I72.3 ANEURYSM OF ILIAC ARTERY (HCC): ICD-10-CM

## 2018-11-26 DIAGNOSIS — I25.10 CORONARY ARTERIOSCLEROSIS: ICD-10-CM

## 2018-11-26 PROCEDURE — G0008 ADMIN INFLUENZA VIRUS VAC: HCPCS | Performed by: INTERNAL MEDICINE

## 2018-11-26 PROCEDURE — 90653 IIV ADJUVANT VACCINE IM: CPT | Performed by: INTERNAL MEDICINE

## 2018-11-28 ENCOUNTER — PRIOR ORIGINAL RECORDS (OUTPATIENT)
Dept: OTHER | Age: 68
End: 2018-11-28

## 2018-11-28 ENCOUNTER — MYAURORA ACCOUNT LINK (OUTPATIENT)
Dept: OTHER | Age: 68
End: 2018-11-28

## 2018-12-05 ENCOUNTER — MED REC SCAN ONLY (OUTPATIENT)
Dept: INTERNAL MEDICINE CLINIC | Facility: CLINIC | Age: 68
End: 2018-12-05

## 2018-12-05 ENCOUNTER — PRIOR ORIGINAL RECORDS (OUTPATIENT)
Dept: OTHER | Age: 68
End: 2018-12-05

## 2019-01-09 ENCOUNTER — MED REC SCAN ONLY (OUTPATIENT)
Dept: INTERNAL MEDICINE CLINIC | Facility: CLINIC | Age: 69
End: 2019-01-09

## 2019-01-30 ENCOUNTER — HOSPITAL ENCOUNTER (OUTPATIENT)
Dept: CT IMAGING | Facility: HOSPITAL | Age: 69
Discharge: HOME | End: 2019-01-30
Attending: INTERNAL MEDICINE

## 2019-01-30 DIAGNOSIS — Z13.9 ENCOUNTER FOR SCREENING: ICD-10-CM

## 2019-01-31 ENCOUNTER — APPOINTMENT (OUTPATIENT)
Dept: LAB | Age: 69
End: 2019-01-31
Attending: INTERNAL MEDICINE
Payer: MEDICARE

## 2019-01-31 ENCOUNTER — NURSE ONLY (OUTPATIENT)
Dept: INTERNAL MEDICINE CLINIC | Facility: CLINIC | Age: 69
End: 2019-01-31
Payer: MEDICARE

## 2019-01-31 DIAGNOSIS — Z00.00 ROUTINE GENERAL MEDICAL EXAMINATION AT A HEALTH CARE FACILITY: Primary | ICD-10-CM

## 2019-01-31 DIAGNOSIS — E83.119 HEMOCHROMATOSIS, UNSPECIFIED HEMOCHROMATOSIS TYPE: ICD-10-CM

## 2019-01-31 DIAGNOSIS — Z23 VACCINE FOR STREPTOCOCCUS PNEUMONIAE AND INFLUENZA: ICD-10-CM

## 2019-01-31 LAB
25(OH)D3+25(OH)D2 SERPL-MCNC: 55.7 NG/ML
ABSOLUTE IMMATURE GRANULOCYTES (OFFPRE24): NORMAL
ALBUMIN SERPL-MCNC: 4.4 G/DL
ALBUMIN/GLOB SERPL: NORMAL {RATIO}
ALP SERPL-CCNC: 61 U/L
ALT SERPL-CCNC: 27 U/L
ANION GAP SERPL CALC-SCNC: NORMAL MMOL/L
AST SERPL-CCNC: 30 U/L
BASO+EOS+MONOS # BLD: NORMAL 10*3/UL
BASO+EOS+MONOS NFR BLD: NORMAL %
BASOPHILS # BLD: NORMAL 10*3/UL
BASOPHILS NFR BLD: NORMAL %
BILIRUB SERPL-MCNC: 0.6 MG/DL
BILIRUB UR QL STRIP.AUTO: NEGATIVE
BUN SERPL-MCNC: 16 MG/DL
BUN/CREAT SERPL: NORMAL
CALCIUM SERPL-MCNC: 9.2 MG/DL
CHLORIDE SERPL-SCNC: 101 MMOL/L
CHOLEST SERPL-MCNC: 118 MG/DL
CHOLEST/HDLC SERPL: NORMAL {RATIO}
CO2 SERPL-SCNC: NORMAL MMOL/L
COLOR UR AUTO: YELLOW
CREAT SERPL-MCNC: 0.66 MG/DL
DEPRECATED HBV CORE AB SER IA-ACNC: 21.1 NG/ML (ref 30–530)
DIFFERENTIAL METHOD BLD: NORMAL
EOSINOPHIL # BLD: NORMAL 10*3/UL
EOSINOPHIL NFR BLD: NORMAL %
ERYTHROCYTE [DISTWIDTH] IN BLOOD: NORMAL %
EST. AVERAGE GLUCOSE BLD GHB EST-MCNC: NORMAL MG/DL
GLOBULIN SER-MCNC: 2.4 G/DL
GLUCOSE SERPL-MCNC: 100 MG/DL
GLUCOSE UR STRIP.AUTO-MCNC: NEGATIVE MG/DL
HBA1C MFR BLD: 5.4 %
HBA1C MFR BLD: NORMAL % (ref 4.5–5.6)
HCT VFR BLD CALC: 47 %
HDLC SERPL-MCNC: 69 MG/DL
HGB BLD-MCNC: 16.3 G/DL
IMMATURE GRANULOCYTES (OFFPRE25): NORMAL
IRON SATURATION: 21 % (ref 20–50)
IRON: 72 UG/DL (ref 45–182)
KETONES UR STRIP.AUTO-MCNC: NEGATIVE MG/DL
LDLC SERPL CALC-MCNC: 43 MG/DL
LENGTH OF FAST TIME PATIENT: NORMAL H
LENGTH OF FAST TIME PATIENT: NORMAL H
LEUKOCYTE ESTERASE UR QL STRIP.AUTO: NEGATIVE
LYMPHOCYTES # BLD: NORMAL 10*3/UL
LYMPHOCYTES NFR BLD: NORMAL %
MCH RBC QN AUTO: NORMAL PG
MCHC RBC AUTO-ENTMCNC: NORMAL G/DL
MCV RBC AUTO: NORMAL FL
MONOCYTES # BLD: NORMAL 10*3/UL
MONOCYTES NFR BLD: NORMAL %
MPV (OFFPRE2): NORMAL
NEUTROPHILS # BLD: NORMAL 10*3/UL
NEUTROPHILS NFR BLD: NORMAL %
NITRITE UR QL STRIP.AUTO: NEGATIVE
NONHDLC SERPL-MCNC: 49 MG/DL
NRBC BLD MANUAL-RTO: NORMAL %
PH UR STRIP.AUTO: 7 [PH] (ref 4.5–8)
PLAT MORPH BLD: NORMAL
PLATELET # BLD: 167 10*3/UL
POTASSIUM SERPL-SCNC: 4.6 MMOL/L
PROT SERPL-MCNC: 6.8 G/DL
PROT UR STRIP.AUTO-MCNC: NEGATIVE MG/DL
RBC # BLD: 5.12 10*6/UL
RBC MORPH BLD: NORMAL
RBC UR QL AUTO: NEGATIVE
SODIUM SERPL-SCNC: 140 MMOL/L
SP GR UR STRIP.AUTO: 1.01 (ref 1–1.03)
TOTAL IRON BINDING CAPACITY: 350 UG/DL (ref 240–450)
TRANSFERRIN SERPL-MCNC: 235 MG/DL (ref 200–360)
TRIGL SERPL-MCNC: 32 MG/DL
TSH SERPL-ACNC: 1.5 M[IU]/L
UROBILINOGEN UR STRIP.AUTO-MCNC: <2 MG/DL
VLDLC SERPL CALC-MCNC: NORMAL MG/DL
WBC # BLD: 4.2 10*3/UL
WBC MORPH BLD: NORMAL

## 2019-01-31 PROCEDURE — 83540 ASSAY OF IRON: CPT

## 2019-01-31 PROCEDURE — 94010 BREATHING CAPACITY TEST: CPT | Performed by: INTERNAL MEDICINE

## 2019-01-31 PROCEDURE — 93000 ELECTROCARDIOGRAM COMPLETE: CPT | Performed by: INTERNAL MEDICINE

## 2019-01-31 PROCEDURE — 92551 PURE TONE HEARING TEST AIR: CPT | Performed by: INTERNAL MEDICINE

## 2019-01-31 PROCEDURE — 99173 VISUAL ACUITY SCREEN: CPT | Performed by: INTERNAL MEDICINE

## 2019-01-31 PROCEDURE — 82728 ASSAY OF FERRITIN: CPT

## 2019-01-31 PROCEDURE — 81003 URINALYSIS AUTO W/O SCOPE: CPT | Performed by: INTERNAL MEDICINE

## 2019-01-31 PROCEDURE — G0009 ADMIN PNEUMOCOCCAL VACCINE: HCPCS | Performed by: INTERNAL MEDICINE

## 2019-01-31 PROCEDURE — 90732 PPSV23 VACC 2 YRS+ SUBQ/IM: CPT | Performed by: INTERNAL MEDICINE

## 2019-01-31 PROCEDURE — 83550 IRON BINDING TEST: CPT

## 2019-01-31 PROCEDURE — 36415 COLL VENOUS BLD VENIPUNCTURE: CPT

## 2019-01-31 NOTE — PROGRESS NOTES
*LOOKIN' BODY RESULTS    YES: x      NO:       REASON TEST NOT PERFORMED:        HEIGHT: 5'6  XDMLAS:391  _____________________________________________________________________________  *VENIPUNCTURE    YES:       NO:  x      REASON VENIPUNCTURE NOT PERFORM

## 2019-02-04 ENCOUNTER — PRIOR ORIGINAL RECORDS (OUTPATIENT)
Dept: OTHER | Age: 69
End: 2019-02-04

## 2019-02-05 LAB
AMB EXT CHOLESTEROL, TOTAL: 118 MG/DL
AMB EXT HDL CHOLESTEROL: 69 MG/DL
AMB EXT HEMOGLOBIN: 16.3
AMB EXT HGBA1C: 5.4 %
AMB EXT LDL CHOLESTEROL, DIRECT: 43 MG/DL
AMB EXT PLATELETS: 167
AMB EXT PSA SCREEN: 0.7 NG/ML
AMB EXT TRIGLYCERIDES: 32 MG/DL
AMB EXT TSH: 1.5 MIU/ML
AMB EXT WBC: 4.2 X10(3)UL

## 2019-02-06 ENCOUNTER — MED REC SCAN ONLY (OUTPATIENT)
Dept: INTERNAL MEDICINE CLINIC | Facility: CLINIC | Age: 69
End: 2019-02-06

## 2019-02-11 NOTE — PROGRESS NOTES
HEALTH MAINTENANCE:      Immunization History   Administered Date(s) Administered   • FLUAD High Dose 65 yr and older (80114) 01/23/2018, 11/26/2018   • FLUZONE 3 Yrs+ Quad Prsv Free 0.5 ml (25560) 12/09/2016   • Influenza 10/21/2008   • Pneumococcal (Prev No Right Ear @ 40 dBHL - 4000 Hz: No

## 2019-02-12 ENCOUNTER — OFFICE VISIT (OUTPATIENT)
Dept: INTERNAL MEDICINE CLINIC | Facility: CLINIC | Age: 69
End: 2019-02-12
Payer: MEDICARE

## 2019-02-12 VITALS
SYSTOLIC BLOOD PRESSURE: 118 MMHG | HEART RATE: 60 BPM | BODY MASS INDEX: 26.68 KG/M2 | OXYGEN SATURATION: 96 % | TEMPERATURE: 98 F | HEIGHT: 66 IN | RESPIRATION RATE: 12 BRPM | WEIGHT: 166 LBS | DIASTOLIC BLOOD PRESSURE: 70 MMHG

## 2019-02-12 DIAGNOSIS — I51.5 CARDIAC CALCIFICATION (HCC): ICD-10-CM

## 2019-02-12 DIAGNOSIS — M25.569 KNEE PAIN, UNSPECIFIED CHRONICITY, UNSPECIFIED LATERALITY: ICD-10-CM

## 2019-02-12 DIAGNOSIS — E04.1 THYROID NODULE: ICD-10-CM

## 2019-02-12 DIAGNOSIS — Z00.00 ROUTINE GENERAL MEDICAL EXAMINATION AT A HEALTH CARE FACILITY: Primary | ICD-10-CM

## 2019-02-12 DIAGNOSIS — K57.90 DIVERTICULOSIS OF INTESTINE WITHOUT BLEEDING, UNSPECIFIED INTESTINAL TRACT LOCATION: ICD-10-CM

## 2019-02-12 DIAGNOSIS — R53.83 FATIGUE, UNSPECIFIED TYPE: ICD-10-CM

## 2019-02-12 DIAGNOSIS — D12.6 ADENOMATOUS POLYP OF COLON, UNSPECIFIED PART OF COLON: ICD-10-CM

## 2019-02-12 DIAGNOSIS — D48.5 NEOPLASM OF UNCERTAIN BEHAVIOR OF SKIN: ICD-10-CM

## 2019-02-12 DIAGNOSIS — I10 ESSENTIAL HYPERTENSION: ICD-10-CM

## 2019-02-12 DIAGNOSIS — I72.9 ANEURYSM (HCC): ICD-10-CM

## 2019-02-12 DIAGNOSIS — I25.10 CORONARY ARTERY DISEASE INVOLVING NATIVE HEART, ANGINA PRESENCE UNSPECIFIED, UNSPECIFIED VESSEL OR LESION TYPE: ICD-10-CM

## 2019-02-12 DIAGNOSIS — E83.119 HEMOCHROMATOSIS, UNSPECIFIED HEMOCHROMATOSIS TYPE: ICD-10-CM

## 2019-02-12 DIAGNOSIS — E78.5 ELEVATED LIPIDS: ICD-10-CM

## 2019-02-12 PROBLEM — M17.12 ARTHRITIS OF KNEE, LEFT: Status: ACTIVE | Noted: 2019-02-12

## 2019-02-12 PROCEDURE — G0438 PPPS, INITIAL VISIT: HCPCS | Performed by: INTERNAL MEDICINE

## 2019-02-12 RX ORDER — ESCITALOPRAM OXALATE 10 MG/1
10 TABLET ORAL DAILY
Qty: 30 TABLET | Refills: 2 | Status: SHIPPED | OUTPATIENT
Start: 2019-02-12 | End: 2019-05-13

## 2019-02-12 NOTE — PROGRESS NOTES
HPI:    Patient ID: Janell Muñoz is a 79year old male. HPI DEAR Hiren Hein    IT WAS NICE SEEING YOU FOR YOUR WELLNESS VISIT ON 2/12/2019           Review of Systems   HPI:    Patient ID: Janell Muñoz is a 79year old male.     History of Present Illness: shortness of breath and wheezing. No active smoking. Cardiovascular: Negative for chest pain, palpitations, syncope, and edema. No symptoms of heart failure.   Patient will pursue Karthik Delgado through Dr. Patric Dumont    Gastrointestinal: Negative for nausea, v Hemochromatosis     Elevated lipids     Hyperglycemia     Aneurysm (HCC)     Thyroid nodule     Cardiac calcification [facility])     Colon polyp     Diverticulosis     Carotid artery dissection (HCC)     Essential hypertension     Lip lesion     Other seborrhei Laterality Date   • CHOLECYSTECTOMY     • COLONOSCOPY  01/04/2014    colon polyp, sessile serrated adenoma   • COLONOSCOPY  12/31/2013   • HERNIA SURGERY  11/06/2013    bilatera inguinal hernia repair with mesh -Dr. Annette Hammond   • IR ANGIOGRAM CEREBRAL CAROTID COLLEGE   AND  SOME  SOLVENTS    Tobacco Use      Smoking status: Never Smoker      Smokeless tobacco: Never Used    Substance and Sexual Activity      Alcohol use: Yes        Alcohol/week: 0.0 oz        Comment: BEER   7  CANS  WEEKLY      Drug use:  No rectal masses. Prostate:     Musculoskeletal: Normal range of motion. No edema and no tenderness. No effusions. Hem/Onc: No adenopathy. No bruising. Neurological: Normal reflexes. No cranial nerve deficit or sensory deficit. Normal muscle tone. after shelter. Many times are self worth is related to her workplace.     But also has some concerns of some low-grade mood disorder some sleep issues some crying some irritability nothing serious but I think would respond to 10 mg of Lexapro if he feel will be completed soon history of polyps just a month or 2 late. 6.  Did talk regarding actinic keratosis. Dr. Maura Ramos. Usually he will deal with the lesions that he sees.   We did talk about blue light and 5-FU treatments I do not know if he does of bone marrow failure or preleukemia or anemia. PSA 0.695 within normal limits and no evidence of elevated velocity    Spirometry normal    EKG sinus rhythm questionable LVH. And will be getting stress test  -  LAD stable at the present time.   Some unspecified vessel or lesion type  Fatigue, unspecified type    Orders Placed This Encounter      Testosterone, Total Free, Male [E]      Meds This Visit:  Requested Prescriptions     Signed Prescriptions Disp Refills   • escitalopram 10 MG Oral Tab 30 tab

## 2019-02-19 ENCOUNTER — HOSPITAL ENCOUNTER (OUTPATIENT)
Dept: GENERAL RADIOLOGY | Facility: HOSPITAL | Age: 69
Discharge: HOME | End: 2019-02-19
Attending: INTERNAL MEDICINE
Payer: MEDICARE

## 2019-02-19 ENCOUNTER — LAB ENCOUNTER (OUTPATIENT)
Dept: LAB | Age: 69
End: 2019-02-19
Attending: INTERNAL MEDICINE
Payer: MEDICARE

## 2019-02-19 DIAGNOSIS — M25.569 KNEE PAIN, UNSPECIFIED CHRONICITY, UNSPECIFIED LATERALITY: ICD-10-CM

## 2019-02-19 DIAGNOSIS — M25.562 CHRONIC PAIN OF BOTH KNEES: Primary | ICD-10-CM

## 2019-02-19 DIAGNOSIS — G89.29 CHRONIC PAIN OF BOTH KNEES: Primary | ICD-10-CM

## 2019-02-19 DIAGNOSIS — R53.83 FATIGUE, UNSPECIFIED TYPE: ICD-10-CM

## 2019-02-19 DIAGNOSIS — M25.561 CHRONIC PAIN OF BOTH KNEES: Primary | ICD-10-CM

## 2019-02-19 PROCEDURE — 73560 X-RAY EXAM OF KNEE 1 OR 2: CPT | Performed by: INTERNAL MEDICINE

## 2019-02-19 PROCEDURE — 84403 ASSAY OF TOTAL TESTOSTERONE: CPT

## 2019-02-19 PROCEDURE — 36415 COLL VENOUS BLD VENIPUNCTURE: CPT

## 2019-02-19 PROCEDURE — 84402 ASSAY OF FREE TESTOSTERONE: CPT

## 2019-02-19 NOTE — PROGRESS NOTES
HPI:    Patient ID: Marylen Mosher is a 79year old male. HPI    Review of Systems         Current Outpatient Medications:  aspirin 81 MG Oral Tab Take 81 mg by mouth 3 (three) times daily.  Disp:  Rfl:    escitalopram 10 MG Oral Tab Take 1 tablet (10 mg INTERNAL  CARD NUC STRESS TEST LEXISCAN (KER=[id number]/)       XJ#[id number]

## 2019-02-19 NOTE — PROGRESS NOTES
Both knees mild arthritis that is all still could have  -some other internal derangement if not better see orthopedics I believe I gave him a number

## 2019-02-20 ENCOUNTER — TELEPHONE (OUTPATIENT)
Dept: NEUROLOGY | Facility: CLINIC | Age: 69
End: 2019-02-20

## 2019-02-22 ENCOUNTER — TELEPHONE (OUTPATIENT)
Dept: INTERNAL MEDICINE CLINIC | Facility: CLINIC | Age: 69
End: 2019-02-22

## 2019-02-22 NOTE — TELEPHONE ENCOUNTER
1. Patient was looking for results from testosterone testing done on 2/19/19. Advised those results are still pending. 2. Patient is looking for results from [address] for both knees, also done on 2/19/19.   The results for the right knee are on Mychart, but

## 2019-02-22 NOTE — TELEPHONE ENCOUNTER
1. ) Result pending   2.) Released  3.) Patient read all of the side effects.  He has concerns with the following side effects: Caution with hx of prolonged QT interval, patient is concerned because he knows he had an abnormality on his ekg but he didn't kno

## 2019-02-23 LAB
TESTOSTERONE TOTAL: 684 NG/DL
TESTOSTERONE, FREE -MS/MS: 87.4 PG/ML

## 2019-02-27 ENCOUNTER — TELEPHONE (OUTPATIENT)
Dept: INTERNAL MEDICINE CLINIC | Facility: CLINIC | Age: 69
End: 2019-02-27

## 2019-02-27 DIAGNOSIS — E78.5 ELEVATED LIPIDS: Primary | ICD-10-CM

## 2019-02-27 DIAGNOSIS — Z51.81 THERAPEUTIC DRUG MONITORING: ICD-10-CM

## 2019-02-27 RX ORDER — ROSUVASTATIN CALCIUM 20 MG/1
20 TABLET, COATED ORAL
Qty: 36 TABLET | Refills: 1 | Status: SHIPPED | OUTPATIENT
Start: 2019-02-27 | End: 2019-05-13

## 2019-02-27 NOTE — TELEPHONE ENCOUNTER
PC to patient    Patient to begin on Crestor 20 mg three times per week ( Mon, Wed and Fri)     Script processed to [facility]    Repeat Lipids and LFT in 6 weeks. Patient aware of above.

## 2019-02-27 NOTE — TELEPHONE ENCOUNTER
Patient was told to call the office to give update since he has stopped taking his statins.   Please call

## 2019-02-27 NOTE — TELEPHONE ENCOUNTER
PC to pt. Patient stopped Atorvastatin 40 mg  X 2 weeks , muscles and joints feel 30 % better. Spontaneous coughing much better. Dr. Angella Jones notified.

## 2019-02-28 ENCOUNTER — OFFICE VISIT (OUTPATIENT)
Dept: SURGERY | Facility: CLINIC | Age: 69
End: 2019-02-28
Payer: MEDICARE

## 2019-02-28 VITALS
HEIGHT: 68 IN | BODY MASS INDEX: 24.1 KG/M2 | SYSTOLIC BLOOD PRESSURE: 100 MMHG | DIASTOLIC BLOOD PRESSURE: 68 MMHG | WEIGHT: 159 LBS | HEART RATE: 61 BPM

## 2019-02-28 VITALS
HEIGHT: 68 IN | SYSTOLIC BLOOD PRESSURE: 128 MMHG | HEART RATE: 76 BPM | BODY MASS INDEX: 24.1 KG/M2 | WEIGHT: 159 LBS | DIASTOLIC BLOOD PRESSURE: 82 MMHG

## 2019-02-28 VITALS
SYSTOLIC BLOOD PRESSURE: 120 MMHG | BODY MASS INDEX: 24.25 KG/M2 | DIASTOLIC BLOOD PRESSURE: 70 MMHG | HEART RATE: 72 BPM | HEIGHT: 68 IN | WEIGHT: 160 LBS

## 2019-02-28 VITALS — HEART RATE: 72 BPM | DIASTOLIC BLOOD PRESSURE: 80 MMHG | SYSTOLIC BLOOD PRESSURE: 118 MMHG

## 2019-02-28 DIAGNOSIS — I77.71 CAROTID ARTERY DISSECTION (HCC): Primary | ICD-10-CM

## 2019-02-28 PROCEDURE — 99213 OFFICE O/P EST LOW 20 MIN: CPT | Performed by: NURSE PRACTITIONER

## 2019-02-28 NOTE — PROGRESS NOTES
Review of Systems:    Hand Dominance: right  General: no symptoms reported  Neuro: no symptoms reported  Head: dizziness/spinning  Musculoskeletal: no symptoms reported  Cardiovascular: no symptoms reported  Gastrointestinal: no symptoms reported  Genitour

## 2019-02-28 NOTE — PROGRESS NOTES
[facility]  Interventional Neuroradiology Progress Note    Manuel Stoll     10/22/1950 MRN [id number]   Location [address] [place] [address], [address], [address] [place] [address] PCP Raymond Weaver MD     Dear Vj Mathews and Audrey Jiménez,    We had the pleasur expression/comprehension.      The patient denies constitutional symptoms, such as fevers, chills, night sweats, weight loss, chest pain, shortness of breath, gastrointestinal or genitourinary symptoms    Objective/Physical Exam:    Vital Signs:  Blood pres rotation of the neck. Patient seen independently and along with Dr. Courtney Trevino.     SHAHEEN Soto  [facility]  2/28/2019, 12:10 PM

## 2019-03-01 ENCOUNTER — TELEPHONE (OUTPATIENT)
Dept: INTERNAL MEDICINE CLINIC | Facility: CLINIC | Age: 69
End: 2019-03-01

## 2019-03-01 VITALS
DIASTOLIC BLOOD PRESSURE: 78 MMHG | SYSTOLIC BLOOD PRESSURE: 116 MMHG | HEIGHT: 68 IN | WEIGHT: 162 LBS | HEART RATE: 72 BPM | BODY MASS INDEX: 24.55 KG/M2

## 2019-03-01 NOTE — TELEPHONE ENCOUNTER
Patient called in to report he has been taking Escitalopram x6 nights now and he has been having difficulty with sleep the past 2 nights, waking up in the middle of the night with racing thoughts.  Discussed in depth with patient side effects and adjustment

## 2019-04-19 ENCOUNTER — LAB ENCOUNTER (OUTPATIENT)
Dept: LAB | Age: 69
End: 2019-04-19
Attending: INTERNAL MEDICINE
Payer: MEDICARE

## 2019-04-19 DIAGNOSIS — Z51.81 THERAPEUTIC DRUG MONITORING: ICD-10-CM

## 2019-04-19 DIAGNOSIS — E78.5 ELEVATED LIPIDS: ICD-10-CM

## 2019-04-19 PROCEDURE — 80076 HEPATIC FUNCTION PANEL: CPT

## 2019-04-19 PROCEDURE — 80061 LIPID PANEL: CPT

## 2019-04-22 ENCOUNTER — TELEPHONE (OUTPATIENT)
Dept: INTERNAL MEDICINE CLINIC | Facility: CLINIC | Age: 69
End: 2019-04-22

## 2019-04-22 RX ORDER — ATORVASTATIN CALCIUM 40 MG/1
TABLET, FILM COATED ORAL
COMMUNITY
End: 2019-11-20

## 2019-04-22 RX ORDER — DIPHENOXYLATE HYDROCHLORIDE AND ATROPINE SULFATE 2.5; .025 MG/1; MG/1
TABLET ORAL
COMMUNITY

## 2019-04-22 RX ORDER — AMLODIPINE BESYLATE 2.5 MG/1
TABLET ORAL
COMMUNITY

## 2019-04-22 RX ORDER — BENAZEPRIL HYDROCHLORIDE 20 MG/1
TABLET ORAL
COMMUNITY

## 2019-04-22 RX ORDER — CALCIUM CARBONATE/VITAMIN D3 600 MG-10
TABLET ORAL
COMMUNITY

## 2019-04-22 RX ORDER — IBUPROFEN 200 MG
CAPSULE ORAL
COMMUNITY

## 2019-04-22 NOTE — TELEPHONE ENCOUNTER
Patient states he missed a call from Dr. Aleksander Adhikari earlier today. Please pass this information on to Dr. Aleksander Adhikari that patient called back, and would like Dr. Aleksander Adhikari to call again.     Phone #[phone]

## 2019-05-13 ENCOUNTER — LAB ENCOUNTER (OUTPATIENT)
Dept: LAB | Age: 69
End: 2019-05-13
Attending: INTERNAL MEDICINE
Payer: MEDICARE

## 2019-05-13 DIAGNOSIS — E83.119 HEMOCHROMATOSIS, UNSPECIFIED HEMOCHROMATOSIS TYPE: ICD-10-CM

## 2019-05-13 PROCEDURE — 83550 IRON BINDING TEST: CPT

## 2019-05-13 PROCEDURE — 82728 ASSAY OF FERRITIN: CPT

## 2019-05-13 PROCEDURE — 83540 ASSAY OF IRON: CPT

## 2019-05-13 PROCEDURE — 36415 COLL VENOUS BLD VENIPUNCTURE: CPT

## 2019-05-13 NOTE — PROGRESS NOTES
Patient presents with:  Medication Follow-Up      HPI: 10 weeks on Lexapro. Initial 2 weeks work on a tough little bit more fidgety and anxious that resolved. And he is really feeling quite well.   We had a long discussion whether to increase that from 10 and leg swelling. or  fainhtness    Gastrointestinal: Negative for nausea, vomiting, abdominal pain, diarrhea, blood in stool and abdominal distention. or  Constipation     Genitourinary: Negative for dysuria, hematuria and difficulty urinating.      Musculo mouth 2 (two) times daily. Disp:  Rfl:        Physical Exam  /80   Pulse 62   Temp 97.6 °F (36.4 °C) (Oral)   Resp 14   Ht 66\"   Wt 160 lb   SpO2 95%   BMI 25.82 kg/m²   Constitutional: Oriented to person, place, and time. No distress.      HEENT: (three) times a week. Monday, Wed and Fri in the evening   • escitalopram 10 MG Oral Tab 90 tablet 1     Sig: Take 1 tablet (10 mg total) by mouth daily. Imaging & Consults:  None    No follow-ups on file.   There are no Patient Instructions on file f

## 2019-08-22 ENCOUNTER — TELEPHONE (OUTPATIENT)
Dept: INTERNAL MEDICINE CLINIC | Facility: CLINIC | Age: 69
End: 2019-08-22

## 2019-08-22 DIAGNOSIS — E83.119 HEMOCHROMATOSIS, UNSPECIFIED HEMOCHROMATOSIS TYPE: Primary | ICD-10-CM

## 2019-08-23 ENCOUNTER — LAB ENCOUNTER (OUTPATIENT)
Dept: LAB | Age: 69
End: 2019-08-23
Attending: INTERNAL MEDICINE
Payer: MEDICARE

## 2019-08-23 DIAGNOSIS — E83.119 HEMOCHROMATOSIS, UNSPECIFIED HEMOCHROMATOSIS TYPE: ICD-10-CM

## 2019-08-23 LAB
DEPRECATED HBV CORE AB SER IA-ACNC: 11.6 NG/ML (ref 30–530)
IRON SATURATION: 13 % (ref 20–50)
IRON SERPL-MCNC: 49 UG/DL (ref 65–175)
TOTAL IRON BINDING CAPACITY: 377 UG/DL (ref 240–450)
TRANSFERRIN SERPL-MCNC: 253 MG/DL (ref 200–360)

## 2019-08-23 PROCEDURE — 83550 IRON BINDING TEST: CPT

## 2019-08-23 PROCEDURE — 82728 ASSAY OF FERRITIN: CPT

## 2019-08-23 PROCEDURE — 83540 ASSAY OF IRON: CPT

## 2019-08-27 ENCOUNTER — LAB ENCOUNTER (OUTPATIENT)
Dept: LAB | Age: 69
End: 2019-08-27
Attending: INTERNAL MEDICINE
Payer: MEDICARE

## 2019-08-27 DIAGNOSIS — E83.119 HEMOCHROMATOSIS, UNSPECIFIED HEMOCHROMATOSIS TYPE: ICD-10-CM

## 2019-08-27 LAB
BASOPHILS # BLD AUTO: 0.06 X10(3) UL (ref 0–0.2)
BASOPHILS NFR BLD AUTO: 1.5 %
DEPRECATED RDW RBC AUTO: 44.5 FL (ref 35.1–46.3)
EOSINOPHIL # BLD AUTO: 0.13 X10(3) UL (ref 0–0.7)
EOSINOPHIL NFR BLD AUTO: 3.2 %
ERYTHROCYTE [DISTWIDTH] IN BLOOD BY AUTOMATED COUNT: 13.7 % (ref 11–15)
HCT VFR BLD AUTO: 45.8 % (ref 39–53)
HGB BLD-MCNC: 14.6 G/DL (ref 13–17.5)
IMM GRANULOCYTES # BLD AUTO: 0.01 X10(3) UL (ref 0–1)
IMM GRANULOCYTES NFR BLD: 0.2 %
LYMPHOCYTES # BLD AUTO: 1.32 X10(3) UL (ref 1–4)
LYMPHOCYTES NFR BLD AUTO: 32.6 %
MCH RBC QN AUTO: 28.4 PG (ref 26–34)
MCHC RBC AUTO-ENTMCNC: 31.9 G/DL (ref 31–37)
MCV RBC AUTO: 89.1 FL (ref 80–100)
MONOCYTES # BLD AUTO: 0.65 X10(3) UL (ref 0.1–1)
MONOCYTES NFR BLD AUTO: 16 %
NEUTROPHILS # BLD AUTO: 1.88 X10 (3) UL (ref 1.5–7.7)
NEUTROPHILS # BLD AUTO: 1.88 X10(3) UL (ref 1.5–7.7)
NEUTROPHILS NFR BLD AUTO: 46.5 %
PLATELET # BLD AUTO: 178 10(3)UL (ref 150–450)
RBC # BLD AUTO: 5.14 X10(6)UL (ref 3.8–5.8)
WBC # BLD AUTO: 4.1 X10(3) UL (ref 4–11)

## 2019-08-27 PROCEDURE — 85025 COMPLETE CBC W/AUTO DIFF WBC: CPT

## 2019-08-30 DIAGNOSIS — I51.5 CARDIAC CALCIFICATION (HCC): ICD-10-CM

## 2019-08-30 DIAGNOSIS — D12.6 ADENOMATOUS POLYP OF COLON, UNSPECIFIED PART OF COLON: ICD-10-CM

## 2019-08-30 DIAGNOSIS — I72.9 ANEURYSM (HCC): ICD-10-CM

## 2019-08-30 DIAGNOSIS — Z23 NEED FOR VACCINATION: ICD-10-CM

## 2019-08-30 DIAGNOSIS — K80.20 GALLBLADDER STONE WITHOUT CHOLECYSTITIS OR OBSTRUCTION: ICD-10-CM

## 2019-08-30 DIAGNOSIS — I10 ESSENTIAL HYPERTENSION: ICD-10-CM

## 2019-08-30 DIAGNOSIS — I77.71 CAROTID ARTERY DISSECTION (HCC): ICD-10-CM

## 2019-08-30 DIAGNOSIS — E04.1 THYROID NODULE: ICD-10-CM

## 2019-08-30 DIAGNOSIS — E78.5 ELEVATED LIPIDS: ICD-10-CM

## 2019-08-30 DIAGNOSIS — Z00.00 ROUTINE GENERAL MEDICAL EXAMINATION AT A HEALTH CARE FACILITY: ICD-10-CM

## 2019-08-30 DIAGNOSIS — J30.1 HAYFEVER: ICD-10-CM

## 2019-08-30 DIAGNOSIS — K57.90 DIVERTICULOSIS OF INTESTINE WITHOUT BLEEDING, UNSPECIFIED INTESTINAL TRACT LOCATION: ICD-10-CM

## 2019-08-30 DIAGNOSIS — Z56.6 STRESS AT WORK: ICD-10-CM

## 2019-08-30 DIAGNOSIS — E83.119 HEMOCHROMATOSIS, UNSPECIFIED HEMOCHROMATOSIS TYPE: ICD-10-CM

## 2019-08-30 DIAGNOSIS — R73.9 HYPERGLYCEMIA: ICD-10-CM

## 2019-08-30 SDOH — HEALTH STABILITY - MENTAL HEALTH: OTHER PHYSICAL AND MENTAL STRAIN RELATED TO WORK: Z56.6

## 2019-09-03 RX ORDER — BENAZEPRIL HYDROCHLORIDE 20 MG/1
TABLET ORAL
Qty: 90 TABLET | Refills: 1 | Status: SHIPPED | OUTPATIENT
Start: 2019-09-03 | End: 2020-02-13

## 2019-09-03 RX ORDER — AMLODIPINE BESYLATE 2.5 MG/1
TABLET ORAL
Qty: 90 TABLET | Refills: 1 | Status: SHIPPED | OUTPATIENT
Start: 2019-09-03 | End: 2020-02-13

## 2019-09-03 NOTE — TELEPHONE ENCOUNTER
Requesting Amlodipine and Benazepril   LOV: 2/12/19 cpe   Last Relevant Labs: 1/31/19  Filled: 9/25/18 #90 with 3 refills    No future appointments. Meds Refilled.

## 2019-10-14 RX ORDER — ESCITALOPRAM OXALATE 10 MG/1
TABLET ORAL
Qty: 90 TABLET | Refills: 3 | Status: SHIPPED | OUTPATIENT
Start: 2019-10-14 | End: 2020-02-13

## 2019-10-23 ENCOUNTER — TELEPHONE (OUTPATIENT)
Dept: CARDIOLOGY | Age: 69
End: 2019-10-23

## 2019-10-23 DIAGNOSIS — I72.0 CAROTID ANEURYSM, RIGHT (CMD): Primary | ICD-10-CM

## 2019-10-23 DIAGNOSIS — I77.71 CAROTID ARTERY DISSECTION (CMD): ICD-10-CM

## 2019-10-24 ENCOUNTER — TELEPHONE (OUTPATIENT)
Dept: INTERNAL MEDICINE CLINIC | Facility: CLINIC | Age: 69
End: 2019-10-24

## 2019-10-24 DIAGNOSIS — I72.3 ANEURYSM OF ILIAC ARTERY (CMD): Primary | ICD-10-CM

## 2019-10-24 DIAGNOSIS — I72.9 ANEURYSM OF UNSPECIFIED SITE (CMD): ICD-10-CM

## 2019-10-24 DIAGNOSIS — E83.119 HEMOCHROMATOSIS, UNSPECIFIED HEMOCHROMATOSIS TYPE: Primary | ICD-10-CM

## 2019-10-24 DIAGNOSIS — I10 HYPERTENSION, UNSPECIFIED TYPE: ICD-10-CM

## 2019-10-24 NOTE — TELEPHONE ENCOUNTER
From: Jordy Deras   Sent: 10/24/2019   3:09 PM CDT   To: [facility] Front Office   Subject: Appointment Request (HM)                           Appointment Request From: Jordy Deras      With Provider: Chelle Madsen MD [-Primary Care Physician-]      P

## 2019-10-24 NOTE — TELEPHONE ENCOUNTER
Order completed and faxed to [facility] for therapeutic phlebotomy. Order then sent to scan.

## 2019-10-25 NOTE — TELEPHONE ENCOUNTER
Per Dr. Janene Roland patient is to complete CBC, TIBC, Iron and Ferritin    Mychart message sent to notify patient.

## 2019-11-08 ENCOUNTER — LAB ENCOUNTER (OUTPATIENT)
Dept: LAB | Age: 69
End: 2019-11-08
Attending: INTERNAL MEDICINE
Payer: MEDICARE

## 2019-11-08 ENCOUNTER — NURSE ONLY (OUTPATIENT)
Dept: INTERNAL MEDICINE CLINIC | Facility: CLINIC | Age: 69
End: 2019-11-08
Payer: MEDICARE

## 2019-11-08 ENCOUNTER — HOSPITAL ENCOUNTER (OUTPATIENT)
Dept: CARDIOLOGY CLINIC | Facility: HOSPITAL | Age: 69
Discharge: HOME | End: 2019-11-08
Attending: INTERNAL MEDICINE
Payer: MEDICARE

## 2019-11-08 DIAGNOSIS — E83.119 HEMOCHROMATOSIS, UNSPECIFIED HEMOCHROMATOSIS TYPE: ICD-10-CM

## 2019-11-08 DIAGNOSIS — I10 HYPERTENSION, UNSPECIFIED TYPE: ICD-10-CM

## 2019-11-08 DIAGNOSIS — I77.71 CAROTID ARTERY DISSECTION (HCC): ICD-10-CM

## 2019-11-08 DIAGNOSIS — I72.9 ANEURYSM OF UNSPECIFIED SITE (HCC): ICD-10-CM

## 2019-11-08 DIAGNOSIS — I72.3 ILIAC ARTERY ANEURYSM (HCC): ICD-10-CM

## 2019-11-08 DIAGNOSIS — I72.0 CAROTID ANEURYSM, RIGHT (HCC): ICD-10-CM

## 2019-11-08 PROCEDURE — 93978 VASCULAR STUDY: CPT | Performed by: INTERNAL MEDICINE

## 2019-11-08 PROCEDURE — 83540 ASSAY OF IRON: CPT

## 2019-11-08 PROCEDURE — 83550 IRON BINDING TEST: CPT

## 2019-11-08 PROCEDURE — 90662 IIV NO PRSV INCREASED AG IM: CPT | Performed by: INTERNAL MEDICINE

## 2019-11-08 PROCEDURE — G0008 ADMIN INFLUENZA VIRUS VAC: HCPCS | Performed by: INTERNAL MEDICINE

## 2019-11-08 PROCEDURE — 36415 COLL VENOUS BLD VENIPUNCTURE: CPT

## 2019-11-08 PROCEDURE — 82728 ASSAY OF FERRITIN: CPT

## 2019-11-08 PROCEDURE — 85025 COMPLETE CBC W/AUTO DIFF WBC: CPT

## 2019-11-08 PROCEDURE — 93880 EXTRACRANIAL BILAT STUDY: CPT | Performed by: INTERNAL MEDICINE

## 2019-11-08 NOTE — PROGRESS NOTES
Discussed with patient CBC stable ferritin low.   I would be comfortable just repeating in a month or 2 but he is now starting to get elbow and arthritic pain which is usually symptomatic for him    Go ahead and get phlebotomy in a couple weeks and then see

## 2019-11-11 DIAGNOSIS — I10 HYPERTENSION, UNSPECIFIED TYPE: ICD-10-CM

## 2019-11-11 DIAGNOSIS — I72.0 CAROTID ANEURYSM, RIGHT (CMD): ICD-10-CM

## 2019-11-11 DIAGNOSIS — I72.9 ANEURYSM OF UNSPECIFIED SITE (CMD): ICD-10-CM

## 2019-11-11 DIAGNOSIS — I72.3 ANEURYSM OF ILIAC ARTERY (CMD): ICD-10-CM

## 2019-11-11 DIAGNOSIS — I77.71 CAROTID ARTERY DISSECTION (CMD): ICD-10-CM

## 2019-11-11 PROCEDURE — 93978 VASCULAR STUDY: CPT | Performed by: INTERNAL MEDICINE

## 2019-11-15 NOTE — PROGRESS NOTES
Incidental thyroid nodule I have recommended patient follow-up with academic Endocrinology he has seen him in the past.  Carotid stable.

## 2019-11-20 ENCOUNTER — OFFICE VISIT (OUTPATIENT)
Dept: CARDIOLOGY | Facility: CLINIC | Age: 69
End: 2019-11-20

## 2019-11-20 VITALS
SYSTOLIC BLOOD PRESSURE: 126 MMHG | DIASTOLIC BLOOD PRESSURE: 82 MMHG | HEIGHT: 67 IN | HEART RATE: 57 BPM | BODY MASS INDEX: 26.53 KG/M2 | WEIGHT: 169 LBS

## 2019-11-20 DIAGNOSIS — I72.3 ANEURYSM OF COMMON ILIAC ARTERY (CMD): ICD-10-CM

## 2019-11-20 DIAGNOSIS — I25.10 CORONARY ARTERY CALCIFICATION SEEN ON CT SCAN: Primary | ICD-10-CM

## 2019-11-20 DIAGNOSIS — I71.40 ABDOMINAL AORTIC ANEURYSM (AAA) WITHOUT RUPTURE (CMD): ICD-10-CM

## 2019-11-20 DIAGNOSIS — I10 ESSENTIAL HYPERTENSION: ICD-10-CM

## 2019-11-20 DIAGNOSIS — Z86.79 HISTORY OF CAROTID ARTERY DISSECTION: ICD-10-CM

## 2019-11-20 PROBLEM — I71.9 AORTIC ANEURYSM OF UNSPECIFIED SITE, WITHOUT RUPTURE (CMD): Status: ACTIVE | Noted: 2019-11-20

## 2019-11-20 PROCEDURE — 99214 OFFICE O/P EST MOD 30 MIN: CPT | Performed by: INTERNAL MEDICINE

## 2019-11-20 RX ORDER — ESCITALOPRAM OXALATE 10 MG/1
TABLET ORAL
COMMUNITY
Start: 2019-10-14

## 2019-11-20 RX ORDER — ROSUVASTATIN CALCIUM 20 MG/1
20 TABLET, COATED ORAL
COMMUNITY
Start: 2019-05-13

## 2019-11-20 ASSESSMENT — PATIENT HEALTH QUESTIONNAIRE - PHQ9
SUM OF ALL RESPONSES TO PHQ9 QUESTIONS 1 AND 2: 0
SUM OF ALL RESPONSES TO PHQ9 QUESTIONS 1 AND 2: 0
2. FEELING DOWN, DEPRESSED OR HOPELESS: NOT AT ALL
1. LITTLE INTEREST OR PLEASURE IN DOING THINGS: NOT AT ALL

## 2019-12-02 ENCOUNTER — CLINICAL ABSTRACT (OUTPATIENT)
Dept: CARDIOLOGY | Age: 69
End: 2019-12-02

## 2019-12-16 RX ORDER — ROSUVASTATIN CALCIUM 20 MG/1
TABLET, COATED ORAL
Qty: 36 TABLET | Refills: 0 | Status: SHIPPED | OUTPATIENT
Start: 2019-12-16 | End: 2020-02-13

## 2019-12-16 NOTE — TELEPHONE ENCOUNTER
Requesting Rosuvastatin   LOV: 2/12/19 cpe   Last Relevant Labs: 4/19/19  Filled: 5/13/19 #36 with 1 refills    No future appointments. Med Refilled.

## 2020-01-01 ENCOUNTER — EXTERNAL RECORD (OUTPATIENT)
Dept: HEALTH INFORMATION MANAGEMENT | Facility: OTHER | Age: 70
End: 2020-01-01

## 2020-01-29 ENCOUNTER — OFFICE VISIT (OUTPATIENT)
Dept: INTERNAL MEDICINE CLINIC | Facility: CLINIC | Age: 70
End: 2020-01-29
Payer: MEDICARE

## 2020-01-29 ENCOUNTER — NURSE ONLY (OUTPATIENT)
Dept: INTERNAL MEDICINE CLINIC | Facility: CLINIC | Age: 70
End: 2020-01-29
Payer: MEDICARE

## 2020-01-29 VITALS
OXYGEN SATURATION: 97 % | RESPIRATION RATE: 12 BRPM | BODY MASS INDEX: 28.28 KG/M2 | DIASTOLIC BLOOD PRESSURE: 70 MMHG | SYSTOLIC BLOOD PRESSURE: 118 MMHG | HEIGHT: 66 IN | TEMPERATURE: 98 F | HEART RATE: 58 BPM | WEIGHT: 176 LBS

## 2020-01-29 DIAGNOSIS — Z00.00 ROUTINE GENERAL MEDICAL EXAMINATION AT A HEALTH CARE FACILITY: Primary | ICD-10-CM

## 2020-01-29 DIAGNOSIS — M70.22 OLECRANON BURSITIS OF LEFT ELBOW: Primary | ICD-10-CM

## 2020-01-29 DIAGNOSIS — E83.119 HEMOCHROMATOSIS, UNSPECIFIED HEMOCHROMATOSIS TYPE: ICD-10-CM

## 2020-01-29 LAB
BILIRUB UR QL STRIP.AUTO: NEGATIVE
CLARITY UR REFRACT.AUTO: CLEAR
COLOR UR AUTO: YELLOW
GLUCOSE UR STRIP.AUTO-MCNC: NEGATIVE MG/DL
KETONES UR STRIP.AUTO-MCNC: NEGATIVE MG/DL
LEUKOCYTE ESTERASE UR QL STRIP.AUTO: NEGATIVE
NITRITE UR QL STRIP.AUTO: NEGATIVE
PH UR STRIP.AUTO: 6 [PH] (ref 4.5–8)
PROT UR STRIP.AUTO-MCNC: NEGATIVE MG/DL
RBC UR QL AUTO: NEGATIVE
SP GR UR STRIP.AUTO: 1.02 (ref 1–1.03)
UROBILINOGEN UR STRIP.AUTO-MCNC: <2 MG/DL

## 2020-01-29 PROCEDURE — 99173 VISUAL ACUITY SCREEN: CPT | Performed by: INTERNAL MEDICINE

## 2020-01-29 PROCEDURE — 81003 URINALYSIS AUTO W/O SCOPE: CPT | Performed by: INTERNAL MEDICINE

## 2020-01-29 PROCEDURE — 92551 PURE TONE HEARING TEST AIR: CPT | Performed by: INTERNAL MEDICINE

## 2020-01-29 PROCEDURE — 93000 ELECTROCARDIOGRAM COMPLETE: CPT | Performed by: INTERNAL MEDICINE

## 2020-01-29 PROCEDURE — 94010 BREATHING CAPACITY TEST: CPT | Performed by: INTERNAL MEDICINE

## 2020-01-29 PROCEDURE — 99213 OFFICE O/P EST LOW 20 MIN: CPT | Performed by: INTERNAL MEDICINE

## 2020-01-29 NOTE — PROGRESS NOTES
Patient presents with:  Elbow Pain: LEFT BURSITIS      HPI: Patient was here to get lab draw but notified nurse that he had a problem with his left elbow. About a week ago he was doing some very repetitive work. At home. Was over an hour or so.     After Skin:        Generally okay especially over the left olecranon bursitis no erythema no pain   Neurological: Negative. Endo/Heme/Allergies: Negative for environmental allergies. Does not bruise/bleed easily. Psychiatric/Behavioral: Negative.         P Bowel sounds are normal. Non tender, no masses, no organomegaly or hernias. Musculoskeletal: Other than modest left olecranon collection totally unremarkable normal range of motion. No edema and no tenderness. No effusions.   Again joint exam is negativ

## 2020-01-29 NOTE — PROGRESS NOTES
*LOOKIN' BODY RESULTS    YES:  x     NO:       REASON TEST NOT PERFORMED:        HEIGHT: 5'6  WEIGHT:  _____________172__lb__________________________________________________________  *VENIPUNCTURE    YES:  x     NO:        REASON VENIPUNCTURE NOT PERFORMED

## 2020-02-07 NOTE — PROGRESS NOTES
HEALTH MAINTENANCE:      Immunization History   Administered Date(s) Administered   • FLU VACC High Dose 65 YRS & Older PRSV Free (98612) 11/08/2019   • FLUAD High Dose 65 yr and older (49561) 01/23/2018, 11/26/2018   • FLUZONE 3 Yrs+ Quad Prsv Free 0.5 ml Yes   Left Ear @ 40dBHL - 2000 Hz: Yes Right Ear @ 40 dBHL - 2000 Hz: Yes   Left Ear @ 40dBHL - 4000 Hz: No Right Ear @ 40 dBHL - 4000 Hz: No

## 2020-02-13 ENCOUNTER — LAB ENCOUNTER (OUTPATIENT)
Dept: LAB | Facility: HOSPITAL | Age: 70
End: 2020-02-13
Attending: INTERNAL MEDICINE
Payer: MEDICARE

## 2020-02-13 ENCOUNTER — OFFICE VISIT (OUTPATIENT)
Dept: INTERNAL MEDICINE CLINIC | Facility: CLINIC | Age: 70
End: 2020-02-13
Payer: MEDICARE

## 2020-02-13 VITALS
HEART RATE: 60 BPM | RESPIRATION RATE: 12 BRPM | TEMPERATURE: 98 F | WEIGHT: 172 LBS | BODY MASS INDEX: 27.64 KG/M2 | HEIGHT: 66 IN | OXYGEN SATURATION: 99 % | DIASTOLIC BLOOD PRESSURE: 80 MMHG | SYSTOLIC BLOOD PRESSURE: 127 MMHG

## 2020-02-13 DIAGNOSIS — Z12.5 SCREENING PSA (PROSTATE SPECIFIC ANTIGEN): ICD-10-CM

## 2020-02-13 DIAGNOSIS — I77.71 CAROTID ARTERY DISSECTION (HCC): ICD-10-CM

## 2020-02-13 DIAGNOSIS — E83.119 HEMOCHROMATOSIS, UNSPECIFIED HEMOCHROMATOSIS TYPE: ICD-10-CM

## 2020-02-13 DIAGNOSIS — Z00.00 ROUTINE GENERAL MEDICAL EXAMINATION AT A HEALTH CARE FACILITY: ICD-10-CM

## 2020-02-13 DIAGNOSIS — Z56.6 STRESS AT WORK: ICD-10-CM

## 2020-02-13 DIAGNOSIS — K57.90 DIVERTICULOSIS OF INTESTINE WITHOUT BLEEDING, UNSPECIFIED INTESTINAL TRACT LOCATION: ICD-10-CM

## 2020-02-13 DIAGNOSIS — Z23 NEED FOR VACCINATION: ICD-10-CM

## 2020-02-13 DIAGNOSIS — Z12.11 ENCOUNTER FOR SCREENING COLONOSCOPY: Primary | ICD-10-CM

## 2020-02-13 DIAGNOSIS — J30.1 HAYFEVER: ICD-10-CM

## 2020-02-13 DIAGNOSIS — D64.9 ANEMIA, UNSPECIFIED TYPE: ICD-10-CM

## 2020-02-13 DIAGNOSIS — I72.9 ANEURYSM (HCC): ICD-10-CM

## 2020-02-13 DIAGNOSIS — I51.5 CARDIAC CALCIFICATION (HCC): ICD-10-CM

## 2020-02-13 DIAGNOSIS — R97.20 INCREASED PROSTATE SPECIFIC ANTIGEN (PSA) VELOCITY: ICD-10-CM

## 2020-02-13 DIAGNOSIS — E78.5 ELEVATED LIPIDS: ICD-10-CM

## 2020-02-13 DIAGNOSIS — D12.6 ADENOMATOUS POLYP OF COLON, UNSPECIFIED PART OF COLON: ICD-10-CM

## 2020-02-13 DIAGNOSIS — E04.1 THYROID NODULE: ICD-10-CM

## 2020-02-13 DIAGNOSIS — K80.20 GALLBLADDER STONE WITHOUT CHOLECYSTITIS OR OBSTRUCTION: ICD-10-CM

## 2020-02-13 DIAGNOSIS — R13.10 DYSPHAGIA, UNSPECIFIED TYPE: ICD-10-CM

## 2020-02-13 DIAGNOSIS — R73.9 HYPERGLYCEMIA: ICD-10-CM

## 2020-02-13 DIAGNOSIS — I10 ESSENTIAL HYPERTENSION: ICD-10-CM

## 2020-02-13 PROBLEM — K13.0 LIP LESION: Status: RESOLVED | Noted: 2018-05-31 | Resolved: 2020-02-13

## 2020-02-13 LAB
COMPLEXED PSA SERPL-MCNC: 1.15 NG/ML (ref ?–4)
DEPRECATED HBV CORE AB SER IA-ACNC: 11.2 NG/ML (ref 30–530)
HGB RETIC QN AUTO: 34.2 PG (ref 28.2–36.6)
IMM RETICS NFR: 0.13 RATIO (ref 0.1–0.3)
IRON SATURATION: 14 % (ref 20–50)
IRON SERPL-MCNC: 57 UG/DL (ref 65–175)
RETICS # AUTO: 47.2 X10(3) UL (ref 22.5–147.5)
RETICS/RBC NFR AUTO: 0.9 % (ref 0.5–2.5)
TOTAL IRON BINDING CAPACITY: 413 UG/DL (ref 240–450)
TRANSFERRIN SERPL-MCNC: 277 MG/DL (ref 200–360)

## 2020-02-13 PROCEDURE — 82728 ASSAY OF FERRITIN: CPT

## 2020-02-13 PROCEDURE — 83540 ASSAY OF IRON: CPT

## 2020-02-13 PROCEDURE — 83550 IRON BINDING TEST: CPT

## 2020-02-13 PROCEDURE — G0439 PPPS, SUBSEQ VISIT: HCPCS | Performed by: INTERNAL MEDICINE

## 2020-02-13 PROCEDURE — 85045 AUTOMATED RETICULOCYTE COUNT: CPT

## 2020-02-13 PROCEDURE — 36415 COLL VENOUS BLD VENIPUNCTURE: CPT

## 2020-02-13 RX ORDER — OLMESARTAN MEDOXOMIL 20 MG/1
20 TABLET ORAL DAILY
Qty: 90 TABLET | Refills: 0 | Status: SHIPPED | OUTPATIENT
Start: 2020-02-13 | End: 2020-02-14

## 2020-02-13 RX ORDER — BENAZEPRIL HYDROCHLORIDE 20 MG/1
20 TABLET ORAL
Qty: 90 TABLET | Refills: 1 | Status: SHIPPED | OUTPATIENT
Start: 2020-02-13 | End: 2020-02-13

## 2020-02-13 RX ORDER — ROSUVASTATIN CALCIUM 20 MG/1
TABLET, COATED ORAL
Qty: 48 TABLET | Refills: 0 | Status: SHIPPED | OUTPATIENT
Start: 2020-02-13 | End: 2020-02-14

## 2020-02-13 RX ORDER — AMLODIPINE BESYLATE 2.5 MG/1
2.5 TABLET ORAL
Qty: 90 TABLET | Refills: 1 | Status: SHIPPED | OUTPATIENT
Start: 2020-02-13 | End: 2020-02-14

## 2020-02-13 RX ORDER — ROSUVASTATIN CALCIUM 20 MG/1
TABLET, COATED ORAL
Qty: 36 TABLET | Refills: 0 | Status: SHIPPED | OUTPATIENT
Start: 2020-02-13 | End: 2020-02-13

## 2020-02-13 RX ORDER — GLUCOSAMINE SULFATE 500 MG
CAPSULE ORAL
Refills: 0 | COMMUNITY
Start: 2020-02-13 | End: 2021-02-26

## 2020-02-13 RX ORDER — BUPROPION HYDROCHLORIDE 150 MG/1
150 TABLET ORAL DAILY
Qty: 30 TABLET | Refills: 2 | Status: SHIPPED | OUTPATIENT
Start: 2020-02-13 | End: 2020-02-14

## 2020-02-13 RX ORDER — ACETAMINOPHEN 500 MG
TABLET ORAL
Refills: 0 | COMMUNITY
Start: 2020-02-13

## 2020-02-13 RX ORDER — ESCITALOPRAM OXALATE 10 MG/1
10 TABLET ORAL
Qty: 90 TABLET | Refills: 3 | Status: SHIPPED | OUTPATIENT
Start: 2020-02-13 | End: 2020-02-14

## 2020-02-13 SDOH — HEALTH STABILITY - MENTAL HEALTH: OTHER PHYSICAL AND MENTAL STRAIN RELATED TO WORK: Z56.6

## 2020-02-13 NOTE — PROGRESS NOTES
HPI:    Patient ID: Timo Jeffers is a 81year old male. HPI DEAR Alyssia Garcia    IT WAS NICE SEEING YOU FOR YOUR WELLNESS VISIT ON 2/13/2020           Review of Systems    HPI:    Patient ID: Timo Jeffers is a 81year old male.     History of Prese Directives     Do you have a healthcare power of ?: No    Do you have a living will?: No     Please go to \"Cognitive Assessment\" under Medicare Assessment section in Charting, test patient and document.     Then, refresh your progress note to see elbows and knees please see phlebotomy issues. Could consider glucosamine. Worse when the patient gets up. Soaks hands in the morning. Hands especially. In the morning. Strength has dropped off a little bit. It does affect quality of life.   Also not HTN (hypertension)     Hemochromatosis     Elevated lipids     Hyperglycemia     Aneurysm (HCC)     Thyroid nodule     Cardiac calcification [facility])     Colon polyp     Diverticulosis     Carotid artery dissection (HCC)     Essential hypertension     Lip les History:   Procedure Laterality Date   • CHOLECYSTECTOMY     • COLONOSCOPY  01/04/2014    colon polyp, sessile serrated adenoma   • COLONOSCOPY  12/31/2013   • HERNIA SURGERY  11/06/2013    bilatera inguinal hernia repair with mesh -Dr. Bekah De Guzman   • IR ANGIO Non-medical: Not on file    Tobacco Use      Smoking status: Never Smoker      Smokeless tobacco: Never Used    Substance and Sexual Activity      Alcohol use:  Yes        Alcohol/week: 0.0 standard drinks        Comment: BEER   7  CANS  WEEKLY      Drug us bilateral 4000 frequency     Eyes: Conjunctivae and EOM are normal. PERRLA. No scleral icterus. Visual Acuity                           Neck: Normal range of motion. Neck supple. Normal carotid pulses no bruits. No JVD present. No edema present.  No mass daily.     • MULTIVITAMIN TAB/CAP        Allergies:  Ampicillin                  Comment:Arthralgias  Lipitor [Atorvastat*    Coughing, MYALGIA   PHYSICAL EXAM:   Physical Exam           ASSESSMENT/PLAN:   Encounter for screening colonoscopy  (primary enco

## 2020-02-14 ENCOUNTER — TELEPHONE (OUTPATIENT)
Dept: INTERNAL MEDICINE CLINIC | Facility: CLINIC | Age: 70
End: 2020-02-14

## 2020-02-14 DIAGNOSIS — I51.5 CARDIAC CALCIFICATION (HCC): ICD-10-CM

## 2020-02-14 DIAGNOSIS — J30.1 HAYFEVER: ICD-10-CM

## 2020-02-14 DIAGNOSIS — E83.119 HEMOCHROMATOSIS, UNSPECIFIED HEMOCHROMATOSIS TYPE: ICD-10-CM

## 2020-02-14 DIAGNOSIS — I10 ESSENTIAL HYPERTENSION: ICD-10-CM

## 2020-02-14 DIAGNOSIS — K57.90 DIVERTICULOSIS OF INTESTINE WITHOUT BLEEDING, UNSPECIFIED INTESTINAL TRACT LOCATION: ICD-10-CM

## 2020-02-14 DIAGNOSIS — Z23 NEED FOR VACCINATION: ICD-10-CM

## 2020-02-14 DIAGNOSIS — Z00.00 ROUTINE GENERAL MEDICAL EXAMINATION AT A HEALTH CARE FACILITY: ICD-10-CM

## 2020-02-14 DIAGNOSIS — E78.5 ELEVATED LIPIDS: ICD-10-CM

## 2020-02-14 DIAGNOSIS — I77.71 CAROTID ARTERY DISSECTION (HCC): ICD-10-CM

## 2020-02-14 DIAGNOSIS — D12.6 ADENOMATOUS POLYP OF COLON, UNSPECIFIED PART OF COLON: ICD-10-CM

## 2020-02-14 DIAGNOSIS — K80.20 GALLBLADDER STONE WITHOUT CHOLECYSTITIS OR OBSTRUCTION: ICD-10-CM

## 2020-02-14 DIAGNOSIS — I72.9 ANEURYSM (HCC): ICD-10-CM

## 2020-02-14 DIAGNOSIS — R73.9 HYPERGLYCEMIA: ICD-10-CM

## 2020-02-14 DIAGNOSIS — Z56.6 STRESS AT WORK: ICD-10-CM

## 2020-02-14 DIAGNOSIS — E04.1 THYROID NODULE: ICD-10-CM

## 2020-02-14 RX ORDER — OLMESARTAN MEDOXOMIL 20 MG/1
20 TABLET ORAL DAILY
Qty: 90 TABLET | Refills: 1 | Status: SHIPPED | OUTPATIENT
Start: 2020-02-14 | End: 2020-10-27

## 2020-02-14 RX ORDER — BUPROPION HYDROCHLORIDE 150 MG/1
150 TABLET ORAL DAILY
Qty: 30 TABLET | Refills: 2 | Status: SHIPPED | OUTPATIENT
Start: 2020-02-14 | End: 2020-02-20

## 2020-02-14 RX ORDER — AMLODIPINE BESYLATE 2.5 MG/1
2.5 TABLET ORAL
Qty: 90 TABLET | Refills: 1 | Status: SHIPPED | OUTPATIENT
Start: 2020-02-14 | End: 2020-10-27

## 2020-02-14 RX ORDER — ROSUVASTATIN CALCIUM 20 MG/1
TABLET, COATED ORAL
Qty: 48 TABLET | Refills: 1 | Status: SHIPPED | OUTPATIENT
Start: 2020-02-14 | End: 2020-07-30

## 2020-02-14 RX ORDER — ESCITALOPRAM OXALATE 10 MG/1
10 TABLET ORAL
Qty: 90 TABLET | Refills: 1 | Status: SHIPPED | OUTPATIENT
Start: 2020-02-14 | End: 2020-10-27

## 2020-02-14 SDOH — HEALTH STABILITY - MENTAL HEALTH: OTHER PHYSICAL AND MENTAL STRAIN RELATED TO WORK: Z56.6

## 2020-02-14 NOTE — TELEPHONE ENCOUNTER
Patient states long term meds need to be sent to [facility] not [facility] with the exception of Bupropion should go to [facility] in [place].

## 2020-02-19 ENCOUNTER — TELEPHONE (OUTPATIENT)
Dept: INTERNAL MEDICINE CLINIC | Facility: CLINIC | Age: 70
End: 2020-02-19

## 2020-02-19 DIAGNOSIS — E83.119 HEMOCHROMATOSIS, UNSPECIFIED HEMOCHROMATOSIS TYPE: Primary | ICD-10-CM

## 2020-02-19 NOTE — PROGRESS NOTES
Reviewed with patient will get CBC after next visit phlebotomy end of the month.   I discussed with patient and ordered CBC

## 2020-02-20 ENCOUNTER — TELEPHONE (OUTPATIENT)
Dept: INTERNAL MEDICINE CLINIC | Facility: CLINIC | Age: 70
End: 2020-02-20

## 2020-02-20 RX ORDER — BUPROPION HYDROCHLORIDE 150 MG/1
150 TABLET, EXTENDED RELEASE ORAL DAILY
Qty: 30 TABLET | Refills: 0 | Status: SHIPPED | OUTPATIENT
Start: 2020-02-20 | End: 2020-02-20

## 2020-02-20 RX ORDER — BUPROPION HYDROCHLORIDE 150 MG/1
150 TABLET, EXTENDED RELEASE ORAL DAILY
Qty: 90 TABLET | Refills: 3 | Status: SHIPPED | OUTPATIENT
Start: 2020-02-20 | End: 2020-10-27

## 2020-02-20 NOTE — TELEPHONE ENCOUNTER
Per Dr. Sharolyn Hamman: Change Wellbutrin to Wellbutrin SR 150mg day #90 with 3 refills     Sent temp supply to local [facility] and then long term supply to [facility].

## 2020-03-07 ENCOUNTER — LAB ENCOUNTER (OUTPATIENT)
Dept: LAB | Facility: HOSPITAL | Age: 70
End: 2020-03-07
Attending: INTERNAL MEDICINE
Payer: MEDICARE

## 2020-03-07 ENCOUNTER — HOSPITAL ENCOUNTER (OUTPATIENT)
Dept: GENERAL RADIOLOGY | Facility: HOSPITAL | Age: 70
Discharge: HOME | End: 2020-03-07
Attending: INTERNAL MEDICINE
Payer: MEDICARE

## 2020-03-07 DIAGNOSIS — R13.10 DYSPHAGIA, UNSPECIFIED TYPE: ICD-10-CM

## 2020-03-07 DIAGNOSIS — E83.119 HEMOCHROMATOSIS, UNSPECIFIED HEMOCHROMATOSIS TYPE: ICD-10-CM

## 2020-03-07 LAB
DEPRECATED RDW RBC AUTO: 50.1 FL (ref 35.1–46.3)
ERYTHROCYTE [DISTWIDTH] IN BLOOD BY AUTOMATED COUNT: 16.1 % (ref 11–15)
HCT VFR BLD AUTO: 41.2 % (ref 39–53)
HGB BLD-MCNC: 13.1 G/DL (ref 13–17.5)
MCH RBC QN AUTO: 27 PG (ref 26–34)
MCHC RBC AUTO-ENTMCNC: 31.8 G/DL (ref 31–37)
MCV RBC AUTO: 84.9 FL (ref 80–100)
PLATELET # BLD AUTO: 215 10(3)UL (ref 150–450)
RBC # BLD AUTO: 4.85 X10(6)UL (ref 3.8–5.8)
WBC # BLD AUTO: 4 X10(3) UL (ref 4–11)

## 2020-03-07 PROCEDURE — 36415 COLL VENOUS BLD VENIPUNCTURE: CPT

## 2020-03-07 PROCEDURE — 85027 COMPLETE CBC AUTOMATED: CPT

## 2020-03-07 PROCEDURE — 74246 X-RAY XM UPR GI TRC 2CNTRST: CPT | Performed by: INTERNAL MEDICINE

## 2020-03-18 ENCOUNTER — TELEPHONE (OUTPATIENT)
Dept: INTERNAL MEDICINE CLINIC | Facility: CLINIC | Age: 70
End: 2020-03-18

## 2020-03-18 DIAGNOSIS — D64.9 ANEMIA, UNSPECIFIED TYPE: Primary | ICD-10-CM

## 2020-04-22 ENCOUNTER — VIRTUAL PHONE E/M (OUTPATIENT)
Dept: INTERNAL MEDICINE CLINIC | Facility: CLINIC | Age: 70
End: 2020-04-22
Payer: MEDICARE

## 2020-04-22 DIAGNOSIS — M19.041 OSTEOARTHRITIS OF BOTH HANDS, UNSPECIFIED OSTEOARTHRITIS TYPE: ICD-10-CM

## 2020-04-22 DIAGNOSIS — F39 MOOD DISORDER (HCC): ICD-10-CM

## 2020-04-22 DIAGNOSIS — E83.119 HEMOCHROMATOSIS, UNSPECIFIED HEMOCHROMATOSIS TYPE: Primary | ICD-10-CM

## 2020-04-22 DIAGNOSIS — M19.042 OSTEOARTHRITIS OF BOTH HANDS, UNSPECIFIED OSTEOARTHRITIS TYPE: ICD-10-CM

## 2020-04-22 DIAGNOSIS — I77.71 CAROTID ARTERY DISSECTION (HCC): ICD-10-CM

## 2020-04-22 PROCEDURE — 99442 PHONE E/M BY PHYS 11-20 MIN: CPT | Performed by: INTERNAL MEDICINE

## 2020-04-22 NOTE — PROGRESS NOTES
No chief complaint on file. Please note that the following visit was completed using two-way, real-time interactive audio and/or video communication.   This has been done in good reny to provide continuity of care in the best interest of the provider-pa Elevated lipids     Hyperglycemia     Aneurysm (HCC)     Thyroid nodule     Cardiac calcification [facility])     Colon polyp     Diverticulosis     Carotid artery dissection (HCC)     Essential hypertension     Other seborrheic keratosis     Neoplasm of uncerta No edema and no tenderness. No effusions. No diagnosis found. No orders of the defined types were placed in this encounter.       Meds & Refills for this Visit:  Requested Prescriptions      No prescriptions requested or ordered in this encounter

## 2020-05-05 ENCOUNTER — TELEPHONE (OUTPATIENT)
Dept: INTERNAL MEDICINE CLINIC | Facility: CLINIC | Age: 70
End: 2020-05-05

## 2020-05-05 DIAGNOSIS — M25.569 ACUTE KNEE PAIN, UNSPECIFIED LATERALITY: Primary | ICD-10-CM

## 2020-05-05 DIAGNOSIS — M25.569 KNEE PAIN, UNSPECIFIED CHRONICITY, UNSPECIFIED LATERALITY: Primary | ICD-10-CM

## 2020-05-05 PROCEDURE — 99441 PHONE E/M BY PHYS 5-10 MIN: CPT | Performed by: INTERNAL MEDICINE

## 2020-05-05 NOTE — TELEPHONE ENCOUNTER
Future Appointments   Date Time Provider Jun Crawford   5/6/2020 11:00 AM Chris Marcos MD [facility] [place]

## 2020-05-05 NOTE — TELEPHONE ENCOUNTER
Please note that the following visit was completed using two-way, real-time interactive audio and/or video communication.   This has been done in good reny to provide continuity of care in the best interest of the provider-patient relationship, due to the

## 2020-05-05 NOTE — TELEPHONE ENCOUNTER
Virtual Telephone Check-In    Antony [facility] verbally  Virtual Telephone Check-In    Antony [facility] verbally  Virtual/Telephone Check-In visit on 05/05/20.     Patient understands and accepts financial responsibility for any deductible, co-insuran

## 2020-05-05 NOTE — TELEPHONE ENCOUNTER
Patient Marco Wilson GAIL 10/22/1950 banged his rt knee yesterday spoke with Dr. Gayle [facility]. Patient was told to apply ice, take Tylenol and look for color change and swelling.  Patient called in this morning and can't put any pressure on the rt leg patient is using

## 2020-05-05 NOTE — TELEPHONE ENCOUNTER
Called and spoke with patient. He states he spoke with Dr. Aashish Ferraro yesterday evening about a right knee pain he experienced. Patient has done what Dr. Aashish Ferraro asked (applied ice/elevation and taken tylenol for pain).      Patient reports no change in skin c

## 2020-05-06 ENCOUNTER — OFFICE VISIT (OUTPATIENT)
Dept: INTERNAL MEDICINE CLINIC | Facility: CLINIC | Age: 70
End: 2020-05-06
Payer: MEDICARE

## 2020-05-06 ENCOUNTER — HOSPITAL ENCOUNTER (OUTPATIENT)
Dept: GENERAL RADIOLOGY | Age: 70
Discharge: HOME | End: 2020-05-06
Attending: INTERNAL MEDICINE
Payer: MEDICARE

## 2020-05-06 VITALS
RESPIRATION RATE: 14 BRPM | SYSTOLIC BLOOD PRESSURE: 125 MMHG | OXYGEN SATURATION: 99 % | HEART RATE: 76 BPM | DIASTOLIC BLOOD PRESSURE: 80 MMHG

## 2020-05-06 DIAGNOSIS — M17.12 ARTHRITIS OF KNEE, LEFT: ICD-10-CM

## 2020-05-06 DIAGNOSIS — E83.119 HEMOCHROMATOSIS, UNSPECIFIED HEMOCHROMATOSIS TYPE: ICD-10-CM

## 2020-05-06 DIAGNOSIS — M17.12 ARTHRITIS OF KNEE, LEFT: Primary | ICD-10-CM

## 2020-05-06 PROCEDURE — 99213 OFFICE O/P EST LOW 20 MIN: CPT | Performed by: INTERNAL MEDICINE

## 2020-05-06 PROCEDURE — 73560 X-RAY EXAM OF KNEE 1 OR 2: CPT | Performed by: INTERNAL MEDICINE

## 2020-05-06 NOTE — PROGRESS NOTES
No fracture or definite arthritis with fluid go ahead with our plans using the walker and then physical therapy.   I believe we wanted to get an update in about 7 to 10 days avoid falls

## 2020-05-07 NOTE — PROGRESS NOTES
Patient presents with:  Personal Injury: knee      HPI: Patient call me Monday evening. He got not and heard and felt pain more in the posterior knee area. Was very severe. I had the patient ice it and not ambulate over the night. He had some pain. calcification (HCC)     Colon polyp     Diverticulosis     Carotid artery dissection (HCC)     Essential hypertension     Other seborrheic keratosis     Neoplasm of uncertain behavior of skin     Arthritis of knee, left      Current Outpatient Medications Arthritis of knee, left  (primary encounter diagnosis)  Hemochromatosis, unspecified hemochromatosis type    No orders of the defined types were placed in this encounter.       Meds & Refills for this Visit:  Requested Prescriptions      No prescripti

## 2020-05-13 ENCOUNTER — TELEPHONE (OUTPATIENT)
Dept: INTERNAL MEDICINE CLINIC | Facility: CLINIC | Age: 70
End: 2020-05-13

## 2020-05-13 DIAGNOSIS — M17.12 ARTHRITIS OF LEFT KNEE: ICD-10-CM

## 2020-05-13 DIAGNOSIS — S89.91XA INJURY OF RIGHT KNEE, INITIAL ENCOUNTER: Primary | ICD-10-CM

## 2020-05-13 NOTE — TELEPHONE ENCOUNTER
Referral updated. LMOM to notify patient. Encouraged him to call back with any further questions/concerns/needs.

## 2020-05-13 NOTE — TELEPHONE ENCOUNTER
Patient Eitan Sahu  10/22/1950 needs new orders for physical therapy it needs to say   Treatment injured RT knee and also Arthritis of the left knee.  Please call

## 2020-05-15 ENCOUNTER — LAB ENCOUNTER (OUTPATIENT)
Dept: LAB | Age: 70
End: 2020-05-15
Attending: INTERNAL MEDICINE
Payer: MEDICARE

## 2020-05-15 ENCOUNTER — OFFICE VISIT (OUTPATIENT)
Dept: PHYSICAL THERAPY | Age: 70
End: 2020-05-15
Attending: INTERNAL MEDICINE
Payer: MEDICARE

## 2020-05-15 DIAGNOSIS — M17.12 ARTHRITIS OF KNEE, LEFT: ICD-10-CM

## 2020-05-15 DIAGNOSIS — D64.9 ANEMIA, UNSPECIFIED TYPE: ICD-10-CM

## 2020-05-15 DIAGNOSIS — E78.5 ELEVATED LIPIDS: ICD-10-CM

## 2020-05-15 PROCEDURE — 80061 LIPID PANEL: CPT

## 2020-05-15 PROCEDURE — 83540 ASSAY OF IRON: CPT

## 2020-05-15 PROCEDURE — 85025 COMPLETE CBC W/AUTO DIFF WBC: CPT

## 2020-05-15 PROCEDURE — 82728 ASSAY OF FERRITIN: CPT

## 2020-05-15 PROCEDURE — 97110 THERAPEUTIC EXERCISES: CPT

## 2020-05-15 PROCEDURE — 83550 IRON BINDING TEST: CPT

## 2020-05-15 PROCEDURE — 36415 COLL VENOUS BLD VENIPUNCTURE: CPT

## 2020-05-15 PROCEDURE — 97161 PT EVAL LOW COMPLEX 20 MIN: CPT

## 2020-05-15 PROCEDURE — 97140 MANUAL THERAPY 1/> REGIONS: CPT

## 2020-05-15 NOTE — PROGRESS NOTES
INITIAL EVALUATION:   Referring Physician: Dr. 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

## 2020-05-16 NOTE — PROGRESS NOTES
Total cholesterol 134 better than 1 year ago triglycerides fine LDL 53.    *Excellent    CBC essentially same hemoglobin 13.1 go ahead and get phlebotomy and then we will repeat in 3 months. We may stretch out the timing after that.   Please also schedule

## 2020-05-18 ENCOUNTER — TELEPHONE (OUTPATIENT)
Dept: INTERNAL MEDICINE CLINIC | Facility: CLINIC | Age: 70
End: 2020-05-18

## 2020-05-18 DIAGNOSIS — E83.119 HEMOCHROMATOSIS, UNSPECIFIED HEMOCHROMATOSIS TYPE: Primary | ICD-10-CM

## 2020-05-18 NOTE — TELEPHONE ENCOUNTER
Patient wanted to let you know he had PT consult last Friday. It went well and he will be having visits with PT 2x per week. Nothing further needed at this time.

## 2020-05-20 ENCOUNTER — OFFICE VISIT (OUTPATIENT)
Dept: PHYSICAL THERAPY | Age: 70
End: 2020-05-20
Attending: INTERNAL MEDICINE
Payer: MEDICARE

## 2020-05-20 DIAGNOSIS — M17.12 ARTHRITIS OF KNEE, LEFT: ICD-10-CM

## 2020-05-20 PROCEDURE — 97110 THERAPEUTIC EXERCISES: CPT

## 2020-05-20 NOTE — PROGRESS NOTES
Dx:       -Injury of right knee, initial encounter   -Arthritis of left knee       Authorized # of Visits:  No prior authorization is required per appointment notes         Next MD visit: none scheduled  Fall Risk: standard           Precautions: n/a

## 2020-05-22 ENCOUNTER — OFFICE VISIT (OUTPATIENT)
Dept: PHYSICAL THERAPY | Age: 70
End: 2020-05-22
Attending: INTERNAL MEDICINE
Payer: MEDICARE

## 2020-05-22 DIAGNOSIS — M17.12 ARTHRITIS OF KNEE, LEFT: ICD-10-CM

## 2020-05-22 PROCEDURE — 97110 THERAPEUTIC EXERCISES: CPT

## 2020-05-22 PROCEDURE — 97140 MANUAL THERAPY 1/> REGIONS: CPT

## 2020-05-22 NOTE — PROGRESS NOTES
Dx:       -Injury of right knee, initial encounter   -Arthritis of left knee       Authorized # of Visits:  No prior authorization is required per appointment notes         Next MD visit: none scheduled  Fall Risk: standard           Precautions: n/a caudal glide with belt; long axis distraction  -Joint mobilziation GR I knee extension                                    Charges:  Therapeutic excercise x 3  Total Timed Treatment: 40min    Total Treatment Time: 40 min

## 2020-05-27 ENCOUNTER — OFFICE VISIT (OUTPATIENT)
Dept: PHYSICAL THERAPY | Age: 70
End: 2020-05-27
Attending: INTERNAL MEDICINE
Payer: MEDICARE

## 2020-05-27 DIAGNOSIS — M17.12 ARTHRITIS OF KNEE, LEFT: ICD-10-CM

## 2020-05-27 PROCEDURE — 97140 MANUAL THERAPY 1/> REGIONS: CPT

## 2020-05-27 PROCEDURE — 97110 THERAPEUTIC EXERCISES: CPT

## 2020-05-27 NOTE — PROGRESS NOTES
Dx:       -Injury of right knee, initial encounter   -Arthritis of left knee       Authorized # of Visits:  No prior authorization is required per appointment notes         Next MD visit: none scheduled  Fall Risk: standard           Precautions: n/a supine 90/90 dynamic HS/tibal/sciatic neural mobilization  -Reviewed double leg bridge   Therapeutic excercise  -Stationary bike level 3.0 @ 70 - 80 rpm x 10 min  -Double leg press @ shuttle: 7.0 resistance 15 reps x 3 sets  -Reviewed supine 90/90 dynamic

## 2020-05-29 ENCOUNTER — OFFICE VISIT (OUTPATIENT)
Dept: PHYSICAL THERAPY | Age: 70
End: 2020-05-29
Attending: INTERNAL MEDICINE
Payer: MEDICARE

## 2020-05-29 DIAGNOSIS — M17.12 ARTHRITIS OF KNEE, LEFT: ICD-10-CM

## 2020-05-29 PROCEDURE — 97110 THERAPEUTIC EXERCISES: CPT

## 2020-05-29 PROCEDURE — 97140 MANUAL THERAPY 1/> REGIONS: CPT

## 2020-05-29 NOTE — PROGRESS NOTES
Dx:       -Injury of right knee, initial encounter   -Arthritis of left knee       Authorized # of Visits:  No prior authorization is required per appointment notes         Next MD visit: none scheduled  Fall Risk: standard           Precautions: n/a excercise  -Stationary bike level 3.0 @ 70 - 80 rpm x 10 min  -Double leg press @ shuttle: 7.0 resistance 15 reps x 3 sets  -Reviewed supine 90/90 dynamic HS/tibal/sciatic neural mobilization  -Reviewed double leg bridge Therapeutic excercise  -Stationary

## 2020-06-03 ENCOUNTER — OFFICE VISIT (OUTPATIENT)
Dept: PHYSICAL THERAPY | Age: 70
End: 2020-06-03
Attending: INTERNAL MEDICINE
Payer: MEDICARE

## 2020-06-03 DIAGNOSIS — M17.12 ARTHRITIS OF KNEE, LEFT: ICD-10-CM

## 2020-06-03 PROCEDURE — 97110 THERAPEUTIC EXERCISES: CPT

## 2020-06-03 PROCEDURE — 97140 MANUAL THERAPY 1/> REGIONS: CPT

## 2020-06-03 NOTE — PROGRESS NOTES
Dx:       -Injury of right knee, initial encounter   -Arthritis of left knee       Authorized # of Visits:  No prior authorization is required per appointment notes         Next MD visit: none scheduled  Fall Risk: standard           Precautions: n/a 3 sets  -Reviewed supine 90/90 dynamic HS/tibal/sciatic neural mobilization  -Reviewed double leg bridge   Therapeutic excercise  -Stationary bike level 3.0 @ 70 - 80 rpm x 10 min  -Double leg press @ shuttle: 7.0 resistance 15 reps x 3 sets  -Reviewed sup

## 2020-06-05 ENCOUNTER — APPOINTMENT (OUTPATIENT)
Dept: PHYSICAL THERAPY | Age: 70
End: 2020-06-05
Attending: INTERNAL MEDICINE
Payer: MEDICARE

## 2020-06-11 ENCOUNTER — OFFICE VISIT (OUTPATIENT)
Dept: PHYSICAL THERAPY | Age: 70
End: 2020-06-11
Attending: INTERNAL MEDICINE
Payer: MEDICARE

## 2020-06-11 PROCEDURE — 97140 MANUAL THERAPY 1/> REGIONS: CPT

## 2020-06-12 NOTE — PROGRESS NOTES
Dx:       -Injury of right knee, initial encounter   -Arthritis of left knee       Authorized # of Visits:  No prior authorization is required per appointment notes         Next MD visit: none scheduled  Fall Risk: standard           Precautions: n/a pin and stretch procedures to HS. His home program reviewed and would like him to hold off on his program for now. Post session trunk flexion was without complaint, passive hip flexor was without complaint. Goals remain in progress at this time.       Go 70 - 80 rpm x 10 min  -Double leg press @ shuttle: 8.0 resistance 20 reps x 3 sets  -Reviewed supine 90/90 dynamic HS/tibal/sciatic neural mobilization  -Reviewed double leg bridge Therapeutic excercise  -Double leg press @ shuttle: 8.0 resistance 20 reps

## 2020-06-17 ENCOUNTER — OFFICE VISIT (OUTPATIENT)
Dept: PHYSICAL THERAPY | Age: 70
End: 2020-06-17
Attending: INTERNAL MEDICINE
Payer: MEDICARE

## 2020-06-17 PROCEDURE — 97110 THERAPEUTIC EXERCISES: CPT

## 2020-06-17 PROCEDURE — 97140 MANUAL THERAPY 1/> REGIONS: CPT

## 2020-06-17 NOTE — PROGRESS NOTES
Dx:       -Injury of right knee, initial encounter   -Arthritis of left knee       Authorized # of Visits:  No prior authorization is required per appointment notes         Next MD visit: none scheduled  Fall Risk: standard           Precautions: n/a 5/20/2020  Tx#: 2   Date: 5/22/2020  Tx#:  3   Date: 5/27/2020  Tx#:  4   Date: 5/29/2020  Tx#:  5   Date: 6/3/2020  Tx#:  6   Date: 6/17/2020  Tx#:  7     Therapeutic excercise  -Stationary bike level 3.0 @ 70 - 80 rpm x 10 min  -Double leg press @ shuttl caudal glide with belt; long axis distraction  -Joint mobilziation GR knee extension Manual Therapy  -Joint mobilization hip caudal glide with belt; long axis distraction  -Joint mobilziation GR knee extension Manual Therapy  -Joint mobilization hip caudal

## 2020-06-24 ENCOUNTER — OFFICE VISIT (OUTPATIENT)
Dept: PHYSICAL THERAPY | Age: 70
End: 2020-06-24
Attending: INTERNAL MEDICINE
Payer: MEDICARE

## 2020-06-24 PROCEDURE — 97110 THERAPEUTIC EXERCISES: CPT

## 2020-06-24 NOTE — PROGRESS NOTES
Dx:       -Injury of right knee, initial encounter   -Arthritis of left knee       Authorized # of Visits:  No prior authorization is required per appointment notes         Next MD visit: none scheduled  Fall Risk: standard           Precautions: n/a mobilization  -Reviewed double leg bridge   Therapeutic excercise  -Stationary bike level 3.0 @ 70 - 80 rpm x 10 min  -Double leg press @ shuttle: 7.0 resistance 15 reps x 3 sets  -Reviewed supine 90/90 dynamic HS/tibal/sciatic neural mobilization  -Review Therapy  -Joint mobilization hip caudal glide with belt; long axis distraction  -Joint mobilziation GR knee extension Manual Therapy  -Joint mobilization hip caudal glide with belt; long axis distraction  -Joint mobilization tibia femoral and patellar femo

## 2020-07-01 ENCOUNTER — OFFICE VISIT (OUTPATIENT)
Dept: PHYSICAL THERAPY | Age: 70
End: 2020-07-01
Attending: INTERNAL MEDICINE
Payer: MEDICARE

## 2020-07-01 PROCEDURE — 97110 THERAPEUTIC EXERCISES: CPT

## 2020-07-01 NOTE — PROGRESS NOTES
Discharge Summary    Pt has attended 9 visits in Physical Therapy.     Dx:       -Injury of right knee, initial encounter   -Arthritis of left knee       Authorized # of Visits:  No prior authorization is required per appointment notes         Next MD leija excercise  -Stationary bike level 3.0 @ 70 - 80 rpm x 10 min  -Double leg press @ shuttle: 7.0 resistance 15 reps x 3 sets  -Reviewed supine 90/90 dynamic HS/tibal/sciatic neural mobilziation Therapeutic excercise  -Stationary bike level 3.0 @ 70 - 80 rpm Therapeutic exercise  -Supine HS curl using green stability ball:  1 min x 3 Supine HS curl using green stability ball:  1 min x 3   -Supine gluteal press with contra-lateral leg lift with LE on stability ball: 1 min x 2  -Supine bridge with LE on stabilit

## 2020-07-30 RX ORDER — ROSUVASTATIN CALCIUM 20 MG/1
TABLET, COATED ORAL
Qty: 48 TABLET | Refills: 1 | Status: SHIPPED | OUTPATIENT
Start: 2020-07-30 | End: 2020-10-27

## 2020-08-18 ENCOUNTER — LAB ENCOUNTER (OUTPATIENT)
Dept: LAB | Age: 70
End: 2020-08-18
Attending: INTERNAL MEDICINE
Payer: MEDICARE

## 2020-08-18 DIAGNOSIS — E83.119 HEMOCHROMATOSIS, UNSPECIFIED HEMOCHROMATOSIS TYPE: ICD-10-CM

## 2020-08-18 LAB
BASOPHILS # BLD AUTO: 0.05 X10(3) UL (ref 0–0.2)
BASOPHILS NFR BLD AUTO: 1.1 %
DEPRECATED HBV CORE AB SER IA-ACNC: 8 NG/ML (ref 30–530)
DEPRECATED RDW RBC AUTO: 51.3 FL (ref 35.1–46.3)
EOSINOPHIL # BLD AUTO: 0.16 X10(3) UL (ref 0–0.7)
EOSINOPHIL NFR BLD AUTO: 3.4 %
ERYTHROCYTE [DISTWIDTH] IN BLOOD BY AUTOMATED COUNT: 15.8 % (ref 11–15)
HCT VFR BLD AUTO: 39.7 % (ref 39–53)
HGB BLD-MCNC: 12.3 G/DL (ref 13–17.5)
IMM GRANULOCYTES # BLD AUTO: 0.01 X10(3) UL (ref 0–1)
IMM GRANULOCYTES NFR BLD: 0.2 %
IRON SATURATION: 8 % (ref 20–50)
IRON SERPL-MCNC: 34 UG/DL (ref 65–175)
LYMPHOCYTES # BLD AUTO: 1.46 X10(3) UL (ref 1–4)
LYMPHOCYTES NFR BLD AUTO: 31 %
MCH RBC QN AUTO: 27.6 PG (ref 26–34)
MCHC RBC AUTO-ENTMCNC: 31 G/DL (ref 31–37)
MCV RBC AUTO: 89.2 FL (ref 80–100)
MONOCYTES # BLD AUTO: 0.7 X10(3) UL (ref 0.1–1)
MONOCYTES NFR BLD AUTO: 14.9 %
NEUTROPHILS # BLD AUTO: 2.33 X10 (3) UL (ref 1.5–7.7)
NEUTROPHILS # BLD AUTO: 2.33 X10(3) UL (ref 1.5–7.7)
NEUTROPHILS NFR BLD AUTO: 49.4 %
PLATELET # BLD AUTO: 198 10(3)UL (ref 150–450)
RBC # BLD AUTO: 4.45 X10(6)UL (ref 3.8–5.8)
TOTAL IRON BINDING CAPACITY: 410 UG/DL (ref 240–450)
TRANSFERRIN SERPL-MCNC: 275 MG/DL (ref 200–360)
WBC # BLD AUTO: 4.7 X10(3) UL (ref 4–11)

## 2020-08-18 PROCEDURE — 83540 ASSAY OF IRON: CPT

## 2020-08-18 PROCEDURE — 85025 COMPLETE CBC W/AUTO DIFF WBC: CPT

## 2020-08-18 PROCEDURE — 83550 IRON BINDING TEST: CPT

## 2020-08-18 PROCEDURE — 82728 ASSAY OF FERRITIN: CPT

## 2020-08-26 DIAGNOSIS — D64.9 ANEMIA, UNSPECIFIED TYPE: Primary | ICD-10-CM

## 2020-09-01 ENCOUNTER — PATIENT MESSAGE (OUTPATIENT)
Dept: INTERNAL MEDICINE CLINIC | Facility: CLINIC | Age: 70
End: 2020-09-01

## 2020-09-01 NOTE — TELEPHONE ENCOUNTER
From: Michelle Stoll  To:  Woody Urbina MD  Sent: 9/1/2020 3:19 PM CDT  Subject: Other    Please reference the three receipts attached to add the following three immunizations, received so far this year, to my immunizations MyChart list:   03/07/20

## 2020-09-29 ENCOUNTER — LAB ENCOUNTER (OUTPATIENT)
Dept: LAB | Age: 70
End: 2020-09-29
Attending: INTERNAL MEDICINE
Payer: MEDICARE

## 2020-09-29 DIAGNOSIS — D64.9 ANEMIA, UNSPECIFIED TYPE: ICD-10-CM

## 2020-09-29 PROCEDURE — 85025 COMPLETE CBC W/AUTO DIFF WBC: CPT

## 2020-09-29 PROCEDURE — 83550 IRON BINDING TEST: CPT

## 2020-09-29 PROCEDURE — 83540 ASSAY OF IRON: CPT

## 2020-09-29 PROCEDURE — 82728 ASSAY OF FERRITIN: CPT

## 2020-09-30 ENCOUNTER — TELEPHONE (OUTPATIENT)
Dept: INTERNAL MEDICINE CLINIC | Facility: CLINIC | Age: 70
End: 2020-09-30

## 2020-09-30 NOTE — TELEPHONE ENCOUNTER
----- Message from Ama Henry MD sent at 9/30/2020  7:57 AM CDT -----  Compared to a month ago iron currently 54 last month 34 saturation 14 last month was 8 ferritin 10.8. Had been 8.0.   I do not think he had a phlebotomy just check in with that an

## 2020-09-30 NOTE — PROGRESS NOTES
Compared to a month ago iron currently 54 last month 34 saturation 14 last month was 8 ferritin 10.8. Had been 8.0.   I do not think he had a phlebotomy just check in with that and get back to me

## 2020-09-30 NOTE — TELEPHONE ENCOUNTER
PC to pt     LMTCB to discuss test results and also to see when pt received last phebotomy for dx of hemochromatosis

## 2020-10-05 ENCOUNTER — TELEPHONE (OUTPATIENT)
Dept: INTERNAL MEDICINE CLINIC | Facility: CLINIC | Age: 70
End: 2020-10-05

## 2020-10-05 NOTE — TELEPHONE ENCOUNTER
PC to pt  phebomtomy was last done 5/27/2020    Pt states he usually has it done every 3 mos. , per pt, according to lab results, Dr. Marsha Rodgers had him wait until these lab tests were completed to advise when to do phlebotomy again.

## 2020-10-27 ENCOUNTER — TELEPHONE (OUTPATIENT)
Dept: CARDIOLOGY | Age: 70
End: 2020-10-27

## 2020-10-27 ENCOUNTER — TELEPHONE (OUTPATIENT)
Dept: INTERNAL MEDICINE CLINIC | Facility: CLINIC | Age: 70
End: 2020-10-27

## 2020-10-27 DIAGNOSIS — E04.1 THYROID NODULE: ICD-10-CM

## 2020-10-27 DIAGNOSIS — K80.20 GALLBLADDER STONE WITHOUT CHOLECYSTITIS OR OBSTRUCTION: ICD-10-CM

## 2020-10-27 DIAGNOSIS — Z56.6 STRESS AT WORK: ICD-10-CM

## 2020-10-27 DIAGNOSIS — E78.5 ELEVATED LIPIDS: ICD-10-CM

## 2020-10-27 DIAGNOSIS — Z23 NEED FOR VACCINATION: ICD-10-CM

## 2020-10-27 DIAGNOSIS — I25.10 CORONARY ARTERY CALCIFICATION SEEN ON CT SCAN: Primary | ICD-10-CM

## 2020-10-27 DIAGNOSIS — I51.5 CARDIAC CALCIFICATION (HCC): ICD-10-CM

## 2020-10-27 DIAGNOSIS — I10 ESSENTIAL HYPERTENSION: ICD-10-CM

## 2020-10-27 DIAGNOSIS — R73.9 HYPERGLYCEMIA: ICD-10-CM

## 2020-10-27 DIAGNOSIS — K57.90 DIVERTICULOSIS OF INTESTINE WITHOUT BLEEDING, UNSPECIFIED INTESTINAL TRACT LOCATION: ICD-10-CM

## 2020-10-27 DIAGNOSIS — I72.9 ANEURYSM (HCC): ICD-10-CM

## 2020-10-27 DIAGNOSIS — Z00.00 ROUTINE GENERAL MEDICAL EXAMINATION AT A HEALTH CARE FACILITY: ICD-10-CM

## 2020-10-27 DIAGNOSIS — I77.71 CAROTID ARTERY DISSECTION (HCC): ICD-10-CM

## 2020-10-27 DIAGNOSIS — J30.1 HAYFEVER: ICD-10-CM

## 2020-10-27 DIAGNOSIS — D12.6 ADENOMATOUS POLYP OF COLON, UNSPECIFIED PART OF COLON: ICD-10-CM

## 2020-10-27 DIAGNOSIS — E83.119 HEMOCHROMATOSIS, UNSPECIFIED HEMOCHROMATOSIS TYPE: ICD-10-CM

## 2020-10-27 RX ORDER — AMLODIPINE BESYLATE 2.5 MG/1
2.5 TABLET ORAL
Qty: 90 TABLET | Refills: 1 | Status: SHIPPED | OUTPATIENT
Start: 2020-10-27 | End: 2020-11-09

## 2020-10-27 RX ORDER — OLMESARTAN MEDOXOMIL 20 MG/1
20 TABLET ORAL DAILY
Qty: 90 TABLET | Refills: 1 | Status: SHIPPED | OUTPATIENT
Start: 2020-10-27 | End: 2020-11-09

## 2020-10-27 RX ORDER — ESCITALOPRAM OXALATE 10 MG/1
10 TABLET ORAL
Qty: 90 TABLET | Refills: 1 | Status: SHIPPED | OUTPATIENT
Start: 2020-10-27 | End: 2020-11-09

## 2020-10-27 RX ORDER — BUPROPION HYDROCHLORIDE 150 MG/1
150 TABLET, EXTENDED RELEASE ORAL DAILY
Qty: 90 TABLET | Refills: 3 | Status: SHIPPED | OUTPATIENT
Start: 2020-10-27 | End: 2020-11-09

## 2020-10-27 RX ORDER — ROSUVASTATIN CALCIUM 20 MG/1
TABLET, COATED ORAL
Qty: 48 TABLET | Refills: 1 | Status: SHIPPED | OUTPATIENT
Start: 2020-10-27 | End: 2020-11-09

## 2020-10-27 SDOH — HEALTH STABILITY - MENTAL HEALTH: OTHER PHYSICAL AND MENTAL STRAIN RELATED TO WORK: Z56.6

## 2020-10-27 NOTE — TELEPHONE ENCOUNTER
Pt called and requested scripts refilled for 3 month supply. Pt needs standing order for hemochromatosis at [facility].  Forms filled out and faxed back

## 2020-11-06 ENCOUNTER — HOSPITAL ENCOUNTER (OUTPATIENT)
Dept: CARDIOLOGY CLINIC | Facility: HOSPITAL | Age: 70
Discharge: HOME | End: 2020-11-06
Attending: INTERNAL MEDICINE
Payer: MEDICARE

## 2020-11-06 ENCOUNTER — HOSPITAL ENCOUNTER (OUTPATIENT)
Dept: CV DIAGNOSTICS | Facility: HOSPITAL | Age: 70
Discharge: HOME | End: 2020-11-06
Attending: INTERNAL MEDICINE
Payer: MEDICARE

## 2020-11-06 DIAGNOSIS — I10 ESSENTIAL HYPERTENSION: ICD-10-CM

## 2020-11-06 DIAGNOSIS — Z86.79 HISTORY OF CAROTID ARTERY DISSECTION: ICD-10-CM

## 2020-11-06 DIAGNOSIS — I25.10 CORONARY ARTERY CALCIFICATION SEEN ON CT SCAN: ICD-10-CM

## 2020-11-06 DIAGNOSIS — I71.4 ABDOMINAL AORTIC ANEURYSM (AAA) WITHOUT RUPTURE (HCC): ICD-10-CM

## 2020-11-06 PROCEDURE — 93978 VASCULAR STUDY: CPT | Performed by: INTERNAL MEDICINE

## 2020-11-06 PROCEDURE — 78452 HT MUSCLE IMAGE SPECT MULT: CPT | Performed by: INTERNAL MEDICINE

## 2020-11-06 PROCEDURE — 93017 CV STRESS TEST TRACING ONLY: CPT | Performed by: INTERNAL MEDICINE

## 2020-11-06 PROCEDURE — 93018 CV STRESS TEST I&R ONLY: CPT | Performed by: INTERNAL MEDICINE

## 2020-11-06 PROCEDURE — 93880 EXTRACRANIAL BILAT STUDY: CPT | Performed by: INTERNAL MEDICINE

## 2020-11-09 DIAGNOSIS — E78.5 ELEVATED LIPIDS: ICD-10-CM

## 2020-11-09 DIAGNOSIS — E83.119 HEMOCHROMATOSIS, UNSPECIFIED HEMOCHROMATOSIS TYPE: ICD-10-CM

## 2020-11-09 DIAGNOSIS — I72.9 ANEURYSM (HCC): ICD-10-CM

## 2020-11-09 DIAGNOSIS — J30.1 HAYFEVER: ICD-10-CM

## 2020-11-09 DIAGNOSIS — K80.20 GALLBLADDER STONE WITHOUT CHOLECYSTITIS OR OBSTRUCTION: ICD-10-CM

## 2020-11-09 DIAGNOSIS — Z23 NEED FOR VACCINATION: ICD-10-CM

## 2020-11-09 DIAGNOSIS — D12.6 ADENOMATOUS POLYP OF COLON, UNSPECIFIED PART OF COLON: ICD-10-CM

## 2020-11-09 DIAGNOSIS — I51.5 CARDIAC CALCIFICATION (HCC): ICD-10-CM

## 2020-11-09 DIAGNOSIS — Z00.00 ROUTINE GENERAL MEDICAL EXAMINATION AT A HEALTH CARE FACILITY: ICD-10-CM

## 2020-11-09 DIAGNOSIS — I71.40 ABDOMINAL AORTIC ANEURYSM (AAA) WITHOUT RUPTURE (CMD): ICD-10-CM

## 2020-11-09 DIAGNOSIS — R73.9 HYPERGLYCEMIA: ICD-10-CM

## 2020-11-09 DIAGNOSIS — I77.71 CAROTID ARTERY DISSECTION (HCC): ICD-10-CM

## 2020-11-09 DIAGNOSIS — Z86.79 HISTORY OF CAROTID ARTERY DISSECTION: ICD-10-CM

## 2020-11-09 DIAGNOSIS — Z56.6 STRESS AT WORK: ICD-10-CM

## 2020-11-09 DIAGNOSIS — E04.1 THYROID NODULE: ICD-10-CM

## 2020-11-09 DIAGNOSIS — K57.90 DIVERTICULOSIS OF INTESTINE WITHOUT BLEEDING, UNSPECIFIED INTESTINAL TRACT LOCATION: ICD-10-CM

## 2020-11-09 DIAGNOSIS — I10 ESSENTIAL HYPERTENSION: ICD-10-CM

## 2020-11-09 PROCEDURE — X1094 US VASC ABDOMEN PELVIS VASCULAR DUPLEX STUDY COMPLETE: HCPCS | Performed by: INTERNAL MEDICINE

## 2020-11-09 RX ORDER — ESCITALOPRAM OXALATE 10 MG/1
10 TABLET ORAL
Qty: 90 TABLET | Refills: 1 | Status: SHIPPED | OUTPATIENT
Start: 2020-11-09 | End: 2021-02-26

## 2020-11-09 RX ORDER — ROSUVASTATIN CALCIUM 20 MG/1
TABLET, COATED ORAL
Qty: 48 TABLET | Refills: 1 | Status: SHIPPED | OUTPATIENT
Start: 2020-11-09 | End: 2021-02-26

## 2020-11-09 RX ORDER — OLMESARTAN MEDOXOMIL 20 MG/1
20 TABLET ORAL DAILY
Qty: 90 TABLET | Refills: 1 | Status: SHIPPED | OUTPATIENT
Start: 2020-11-09 | End: 2021-02-26

## 2020-11-09 RX ORDER — AMLODIPINE BESYLATE 2.5 MG/1
2.5 TABLET ORAL
Qty: 90 TABLET | Refills: 1 | Status: SHIPPED | OUTPATIENT
Start: 2020-11-09 | End: 2021-02-26

## 2020-11-09 RX ORDER — BUPROPION HYDROCHLORIDE 150 MG/1
150 TABLET, EXTENDED RELEASE ORAL DAILY
Qty: 90 TABLET | Refills: 3 | Status: SHIPPED | OUTPATIENT
Start: 2020-11-09 | End: 2021-02-26

## 2020-11-09 SDOH — HEALTH STABILITY - MENTAL HEALTH: OTHER PHYSICAL AND MENTAL STRAIN RELATED TO WORK: Z56.6

## 2020-11-09 NOTE — TELEPHONE ENCOUNTER
Medications were sent electronically to [facility] [employer] mail order on 10/27/20 patient states he no longer uses [employer] and will continue to use [facility].

## 2020-11-11 ENCOUNTER — TELEPHONE (OUTPATIENT)
Dept: CARDIOLOGY | Age: 70
End: 2020-11-11

## 2020-11-18 ENCOUNTER — OFFICE VISIT (OUTPATIENT)
Dept: CARDIOLOGY | Facility: CLINIC | Age: 70
End: 2020-11-18

## 2020-11-18 VITALS
BODY MASS INDEX: 27.28 KG/M2 | DIASTOLIC BLOOD PRESSURE: 76 MMHG | HEART RATE: 72 BPM | HEIGHT: 68 IN | WEIGHT: 180 LBS | SYSTOLIC BLOOD PRESSURE: 132 MMHG

## 2020-11-18 DIAGNOSIS — Z86.79 HISTORY OF CAROTID ARTERY DISSECTION: ICD-10-CM

## 2020-11-18 DIAGNOSIS — I71.40 ABDOMINAL AORTIC ANEURYSM (AAA) WITHOUT RUPTURE (CMD): ICD-10-CM

## 2020-11-18 DIAGNOSIS — I25.10 CORONARY ARTERY CALCIFICATION SEEN ON CT SCAN: Primary | ICD-10-CM

## 2020-11-18 DIAGNOSIS — I72.3 ANEURYSM OF COMMON ILIAC ARTERY (CMD): ICD-10-CM

## 2020-11-18 DIAGNOSIS — I10 ESSENTIAL HYPERTENSION: ICD-10-CM

## 2020-11-18 PROCEDURE — 99214 OFFICE O/P EST MOD 30 MIN: CPT | Performed by: INTERNAL MEDICINE

## 2020-11-18 RX ORDER — BUPROPION HYDROCHLORIDE 150 MG/1
150 TABLET, EXTENDED RELEASE ORAL
COMMUNITY
Start: 2020-11-09

## 2020-11-18 SDOH — SOCIAL STABILITY: SOCIAL NETWORK: ARE YOU MARRIED, WIDOWED, DIVORCED, SEPARATED, NEVER MARRIED, OR LIVING WITH A PARTNER?: MARRIED

## 2020-11-18 SDOH — HEALTH STABILITY: PHYSICAL HEALTH: ON AVERAGE, HOW MANY MINUTES DO YOU ENGAGE IN EXERCISE AT THIS LEVEL?: 0 MIN

## 2020-11-18 SDOH — HEALTH STABILITY: PHYSICAL HEALTH: ON AVERAGE, HOW MANY DAYS PER WEEK DO YOU ENGAGE IN MODERATE TO STRENUOUS EXERCISE (LIKE A BRISK WALK)?: 0 DAYS

## 2020-11-18 ASSESSMENT — PATIENT HEALTH QUESTIONNAIRE - PHQ9
2. FEELING DOWN, DEPRESSED OR HOPELESS: NOT AT ALL
CLINICAL INTERPRETATION OF PHQ2 SCORE: NO FURTHER SCREENING NEEDED
SUM OF ALL RESPONSES TO PHQ9 QUESTIONS 1 AND 2: 0
CLINICAL INTERPRETATION OF PHQ9 SCORE: NO FURTHER SCREENING NEEDED
1. LITTLE INTEREST OR PLEASURE IN DOING THINGS: NOT AT ALL
SUM OF ALL RESPONSES TO PHQ9 QUESTIONS 1 AND 2: 0

## 2021-01-06 ENCOUNTER — TELEPHONE (OUTPATIENT)
Dept: INTERNAL MEDICINE CLINIC | Facility: CLINIC | Age: 71
End: 2021-01-06

## 2021-02-08 ENCOUNTER — TELEPHONE (OUTPATIENT)
Dept: INTERNAL MEDICINE CLINIC | Facility: CLINIC | Age: 71
End: 2021-02-08

## 2021-02-16 ENCOUNTER — TELEPHONE (OUTPATIENT)
Dept: FAMILY MEDICINE CLINIC | Facility: CLINIC | Age: 71
End: 2021-02-16

## 2021-02-16 NOTE — TELEPHONE ENCOUNTER
PT CALLED TO SCHEDULE APT.  PT ADV THAT DR SMITH TALKED TO DR PACKER ON 01/21/21 AND ADV THAT DR GTZ WOULD ACCEPT PT.    PT ADV IF THIS IS OK? DID NOT SEE ANY NOTES IN CHART      PLEASE ADV    THANK YOU

## 2021-02-26 ENCOUNTER — OFFICE VISIT (OUTPATIENT)
Dept: FAMILY MEDICINE CLINIC | Facility: CLINIC | Age: 71
End: 2021-02-26
Payer: MEDICARE

## 2021-02-26 VITALS
TEMPERATURE: 98 F | OXYGEN SATURATION: 98 % | DIASTOLIC BLOOD PRESSURE: 66 MMHG | RESPIRATION RATE: 18 BRPM | HEIGHT: 67 IN | BODY MASS INDEX: 28.9 KG/M2 | HEART RATE: 68 BPM | SYSTOLIC BLOOD PRESSURE: 134 MMHG | WEIGHT: 184.13 LBS

## 2021-02-26 DIAGNOSIS — Z00.00 ENCOUNTER FOR MEDICAL EXAMINATION TO ESTABLISH CARE: ICD-10-CM

## 2021-02-26 DIAGNOSIS — K63.5 POLYP OF COLON, UNSPECIFIED PART OF COLON, UNSPECIFIED TYPE: ICD-10-CM

## 2021-02-26 DIAGNOSIS — D12.6 ADENOMATOUS POLYP OF COLON, UNSPECIFIED PART OF COLON: ICD-10-CM

## 2021-02-26 DIAGNOSIS — I51.5 CARDIAC CALCIFICATION (HCC): ICD-10-CM

## 2021-02-26 DIAGNOSIS — F39 MOOD DISORDER (HCC): ICD-10-CM

## 2021-02-26 DIAGNOSIS — R73.9 HYPERGLYCEMIA: ICD-10-CM

## 2021-02-26 DIAGNOSIS — Z12.11 COLON CANCER SCREENING: Primary | ICD-10-CM

## 2021-02-26 DIAGNOSIS — K57.90 DIVERTICULOSIS OF INTESTINE WITHOUT BLEEDING, UNSPECIFIED INTESTINAL TRACT LOCATION: ICD-10-CM

## 2021-02-26 DIAGNOSIS — E04.1 THYROID NODULE: ICD-10-CM

## 2021-02-26 DIAGNOSIS — I77.71 CAROTID ARTERY DISSECTION (HCC): ICD-10-CM

## 2021-02-26 DIAGNOSIS — I72.9 ANEURYSM (HCC): ICD-10-CM

## 2021-02-26 DIAGNOSIS — E78.5 ELEVATED LIPIDS: ICD-10-CM

## 2021-02-26 DIAGNOSIS — I10 ESSENTIAL HYPERTENSION: ICD-10-CM

## 2021-02-26 DIAGNOSIS — E83.119 HEMOCHROMATOSIS, UNSPECIFIED HEMOCHROMATOSIS TYPE: ICD-10-CM

## 2021-02-26 PROCEDURE — 99204 OFFICE O/P NEW MOD 45 MIN: CPT | Performed by: FAMILY MEDICINE

## 2021-02-26 RX ORDER — ESCITALOPRAM OXALATE 10 MG/1
10 TABLET ORAL
Qty: 90 TABLET | Refills: 3 | Status: SHIPPED | OUTPATIENT
Start: 2021-02-26 | End: 2022-02-02

## 2021-02-26 RX ORDER — OLMESARTAN MEDOXOMIL 20 MG/1
20 TABLET ORAL DAILY
Qty: 90 TABLET | Refills: 1 | Status: SHIPPED | OUTPATIENT
Start: 2021-02-26 | End: 2021-10-28

## 2021-02-26 RX ORDER — MUPIROCIN CALCIUM 20 MG/G
1 CREAM TOPICAL 2 TIMES DAILY
Qty: 15 G | Refills: 0 | Status: SHIPPED | OUTPATIENT
Start: 2021-02-26 | End: 2021-03-05

## 2021-02-26 RX ORDER — AMLODIPINE BESYLATE 2.5 MG/1
2.5 TABLET ORAL
Qty: 90 TABLET | Refills: 1 | Status: SHIPPED | OUTPATIENT
Start: 2021-02-26 | End: 2021-10-28

## 2021-02-26 RX ORDER — BUPROPION HYDROCHLORIDE 150 MG/1
150 TABLET, EXTENDED RELEASE ORAL DAILY
Qty: 90 TABLET | Refills: 3 | Status: SHIPPED | OUTPATIENT
Start: 2021-02-26 | End: 2021-08-11

## 2021-02-26 RX ORDER — ROSUVASTATIN CALCIUM 20 MG/1
TABLET, COATED ORAL
Qty: 48 TABLET | Refills: 1 | Status: SHIPPED | OUTPATIENT
Start: 2021-02-26 | End: 2021-10-28

## 2021-02-26 NOTE — PROGRESS NOTES
Rachell Martin is a 78year old male. HPI:   Patient establishing care with me.   His prior PCP retired, referred here by his cardiologist.    Sees dr Jenny Galdamez regularly to follow aneurysms--gets carotid dopplers annually, watching iliacs as well, was hypertrophy)    • Closed head injury 3/2/2011   • Gallbladder stone without cholecystitis or obstruction 9/28/2011   • Hayfever    • Hemochromatosis     w/ therapeutic phlebotomy treatment   • High blood pressure    • High cholesterol    • History of chick History   Problem Relation Age of Onset   • Cancer Father         skin ca, squamous cell   • Stroke Father         multiple   • Hypertension Father    • Other (cerebrovascular disease) Sister         s   • High Blood Pressure Mother    • Thyroid Disorder [facility]    Hyperglycemia-will follow HbA1Cs  -     amLODIPine Besylate 2.5 MG Oral Tab; Take 1 tablet (2.5 mg total) by mouth once daily.       Essential hypertension-Well controlled, CPM   -     amLODIPine Besylate 2.5 MG Oral Ta DOSE)         The patient indicates understanding of these issues and agrees to the plan.

## 2021-03-05 ENCOUNTER — TELEPHONE (OUTPATIENT)
Dept: FAMILY MEDICINE CLINIC | Facility: CLINIC | Age: 71
End: 2021-03-05

## 2021-03-05 RX ORDER — MUPIROCIN CALCIUM 20 MG/G
1 CREAM TOPICAL 2 TIMES DAILY
Qty: 60 G | Refills: 0 | Status: SHIPPED | OUTPATIENT
Start: 2021-03-05 | End: 2021-04-08

## 2021-03-05 NOTE — TELEPHONE ENCOUNTER
LOV 2/26/21    LAST LAB 9/29/20    LAST RX 2/26/21 15G/0rf to use for 14 days    Next OV None scheduled    PROTOCOL    Spoke with the patient, this cream was ordered for a lesion/infection on his head.  It was ordered @ [facility] [employer] but since [facility] is not

## 2021-03-10 ENCOUNTER — MED REC SCAN ONLY (OUTPATIENT)
Dept: FAMILY MEDICINE CLINIC | Facility: CLINIC | Age: 71
End: 2021-03-10

## 2021-04-01 ENCOUNTER — TELEPHONE (OUTPATIENT)
Dept: FAMILY MEDICINE CLINIC | Facility: CLINIC | Age: 71
End: 2021-04-01

## 2021-04-01 DIAGNOSIS — R73.9 HYPERGLYCEMIA: ICD-10-CM

## 2021-04-01 DIAGNOSIS — E78.5 ELEVATED LIPIDS: ICD-10-CM

## 2021-04-01 DIAGNOSIS — I10 ESSENTIAL HYPERTENSION: ICD-10-CM

## 2021-04-01 DIAGNOSIS — E83.119 HEMOCHROMATOSIS, UNSPECIFIED HEMOCHROMATOSIS TYPE: Primary | ICD-10-CM

## 2021-04-01 NOTE — TELEPHONE ENCOUNTER
Patient advised. Verbalized understanding. Will come fasting.     Future Appointments   Date Time Provider Jun Crawford   4/5/2021  8:45 AM EMG STEPH BAKER [facility] EMG Rachell Reynolds   4/8/2021  1:00 PM Chavo Solis MD PF HEM ONC [place]

## 2021-04-01 NOTE — TELEPHONE ENCOUNTER
Pt called, states he was  instructed to get Iron, Ferritin, TIBC and CBC done. Please place orders.   Pt scheduled for Monday, 4/5 for labs, pt will fast.  Any questions please call pt at [phone]

## 2021-04-01 NOTE — TELEPHONE ENCOUNTER
Call from patient. His previous PCP Dr Scout Oconnor retired. Advised he needs repeat labs, thinks CBC, ferritin, iron TIBC done, and anything else [address] thinks he needs. Last labs done 9/29/20. Routing to [address] to advise.     Future Appointments   Date Time Provider

## 2021-04-05 ENCOUNTER — NURSE ONLY (OUTPATIENT)
Dept: FAMILY MEDICINE CLINIC | Facility: CLINIC | Age: 71
End: 2021-04-05
Payer: MEDICARE

## 2021-04-05 DIAGNOSIS — I10 ESSENTIAL HYPERTENSION: ICD-10-CM

## 2021-04-05 DIAGNOSIS — R73.9 HYPERGLYCEMIA: ICD-10-CM

## 2021-04-05 DIAGNOSIS — E83.119 HEMOCHROMATOSIS, UNSPECIFIED HEMOCHROMATOSIS TYPE: ICD-10-CM

## 2021-04-05 DIAGNOSIS — E78.5 ELEVATED LIPIDS: ICD-10-CM

## 2021-04-05 DIAGNOSIS — R73.09 ELEVATED HEMOGLOBIN A1C: Primary | ICD-10-CM

## 2021-04-05 PROCEDURE — 83550 IRON BINDING TEST: CPT | Performed by: FAMILY MEDICINE

## 2021-04-05 PROCEDURE — 83036 HEMOGLOBIN GLYCOSYLATED A1C: CPT | Performed by: FAMILY MEDICINE

## 2021-04-05 PROCEDURE — 81001 URINALYSIS AUTO W/SCOPE: CPT | Performed by: FAMILY MEDICINE

## 2021-04-05 PROCEDURE — 83540 ASSAY OF IRON: CPT | Performed by: FAMILY MEDICINE

## 2021-04-05 PROCEDURE — 84443 ASSAY THYROID STIM HORMONE: CPT | Performed by: FAMILY MEDICINE

## 2021-04-05 PROCEDURE — 80061 LIPID PANEL: CPT | Performed by: FAMILY MEDICINE

## 2021-04-05 PROCEDURE — 82728 ASSAY OF FERRITIN: CPT | Performed by: FAMILY MEDICINE

## 2021-04-05 PROCEDURE — 36415 COLL VENOUS BLD VENIPUNCTURE: CPT | Performed by: FAMILY MEDICINE

## 2021-04-05 PROCEDURE — 85025 COMPLETE CBC W/AUTO DIFF WBC: CPT | Performed by: FAMILY MEDICINE

## 2021-04-05 NOTE — PROGRESS NOTES
Kelley Hearing blood from patients right ac with a straight needle and no difficulties. Took a urine sample for a UA lab.

## 2021-04-08 ENCOUNTER — OFFICE VISIT (OUTPATIENT)
Dept: HEMATOLOGY/ONCOLOGY | Age: 71
End: 2021-04-08
Attending: INTERNAL MEDICINE
Payer: MEDICARE

## 2021-04-08 VITALS
HEART RATE: 65 BPM | TEMPERATURE: 98 F | OXYGEN SATURATION: 95 % | SYSTOLIC BLOOD PRESSURE: 164 MMHG | WEIGHT: 188.19 LBS | BODY MASS INDEX: 29 KG/M2 | DIASTOLIC BLOOD PRESSURE: 88 MMHG | RESPIRATION RATE: 18 BRPM

## 2021-04-08 DIAGNOSIS — E83.110 HEREDITARY HEMOCHROMATOSIS (HCC): ICD-10-CM

## 2021-04-08 DIAGNOSIS — D50.8 IRON DEFICIENCY ANEMIA SECONDARY TO INADEQUATE DIETARY IRON INTAKE: Primary | ICD-10-CM

## 2021-04-08 PROCEDURE — 99205 OFFICE O/P NEW HI 60 MIN: CPT | Performed by: INTERNAL MEDICINE

## 2021-04-08 RX ORDER — CALCIUM CARBONATE/VITAMIN D3 600 MG-20
TABLET ORAL
COMMUNITY
Start: 2018-11-26

## 2021-04-08 RX ORDER — MAG HYDROX/ALUMINUM HYD/SIMETH 400-400-40
SUSPENSION, ORAL (FINAL DOSE FORM) ORAL
COMMUNITY
Start: 2018-11-26

## 2021-04-08 NOTE — PROGRESS NOTES
MD consult for hereditary hemochromatosis. Pt states he was diagnosed in 12. Dr. Edu Scanlon previously managing, but has recently retired. Pt states last therapeutic phlebotomy was done 3 weeks ago and has them done approx every 3 months.  Pt currently comple

## 2021-04-09 NOTE — CONSULTS
[facility]    PATIENT'S NAME: Marly Sanders   CONSULTING PHYSICIAN: Klever Ricci M.D.    PATIENT ACCOUNT #: [de-identified] LOCATION: [address] [place] [address] RECORD #: [id number] YOB: 1950   CONSULTATION DATE: 04/08/2021       [facility] saturation of 16%. She asked that he see us to further help regulate his iron status. PAST MEDICAL HISTORY:  Remarkable for a history of a thyroid nodule.   He has had a previous stroke and he has stents in the left carotid and he has had a right caroti systems is unremarkable with pertinent positives and negatives in the HPI. PHYSICAL EXAMINATION:    GENERAL:  He is a well-appearing male in no acute distress. VITAL SIGNS:  His performance status is 0.   His weight is 188 pounds, blood pressure is Assuming these are both fine, I would attribute his low iron levels to aggressive phlebotomy, then ask him to cut down the frequency to perhaps twice yearly. I have asked him to see me again in 1 year.   We will check his iron studies again in the fall of

## 2021-05-19 ENCOUNTER — OFFICE VISIT (OUTPATIENT)
Dept: CARDIOLOGY | Facility: CLINIC | Age: 71
End: 2021-05-19

## 2021-05-19 VITALS
HEIGHT: 68 IN | DIASTOLIC BLOOD PRESSURE: 80 MMHG | HEART RATE: 66 BPM | SYSTOLIC BLOOD PRESSURE: 120 MMHG | WEIGHT: 181 LBS | BODY MASS INDEX: 27.43 KG/M2

## 2021-05-19 DIAGNOSIS — I10 ESSENTIAL HYPERTENSION: ICD-10-CM

## 2021-05-19 DIAGNOSIS — I25.10 CORONARY ARTERY CALCIFICATION SEEN ON CT SCAN: Primary | ICD-10-CM

## 2021-05-19 DIAGNOSIS — R01.1 MURMUR: ICD-10-CM

## 2021-05-19 DIAGNOSIS — I72.3 ANEURYSM OF COMMON ILIAC ARTERY (CMD): ICD-10-CM

## 2021-05-19 DIAGNOSIS — Z86.79 HISTORY OF CAROTID ARTERY DISSECTION: ICD-10-CM

## 2021-05-19 PROCEDURE — 99214 OFFICE O/P EST MOD 30 MIN: CPT | Performed by: INTERNAL MEDICINE

## 2021-05-19 RX ORDER — OLMESARTAN MEDOXOMIL 20 MG/1
20 TABLET ORAL DAILY
COMMUNITY
Start: 2021-02-26

## 2021-05-19 ASSESSMENT — PATIENT HEALTH QUESTIONNAIRE - PHQ9
SUM OF ALL RESPONSES TO PHQ9 QUESTIONS 1 AND 2: 0
2. FEELING DOWN, DEPRESSED OR HOPELESS: NOT AT ALL
SUM OF ALL RESPONSES TO PHQ9 QUESTIONS 1 AND 2: 0
1. LITTLE INTEREST OR PLEASURE IN DOING THINGS: NOT AT ALL
CLINICAL INTERPRETATION OF PHQ9 SCORE: NO FURTHER SCREENING NEEDED
CLINICAL INTERPRETATION OF PHQ2 SCORE: NO FURTHER SCREENING NEEDED

## 2021-05-21 ENCOUNTER — MED REC SCAN ONLY (OUTPATIENT)
Dept: FAMILY MEDICINE CLINIC | Facility: CLINIC | Age: 71
End: 2021-05-21

## 2021-08-10 ENCOUNTER — PATIENT MESSAGE (OUTPATIENT)
Dept: FAMILY MEDICINE CLINIC | Facility: CLINIC | Age: 71
End: 2021-08-10

## 2021-08-10 NOTE — TELEPHONE ENCOUNTER
From: Olimpia Stoll  To:  Maggie Singletary MD  Sent: 8/10/2021 1:26 AM CDT  Subject: Prescription Question    Hello Dr. Evy Kim and staff,  To allow use of the two Good Rx coupons attached, please send Rx for refill of Bupropion SR, 150 mg, Qty 90 tablets a

## 2021-08-11 RX ORDER — BUPROPION HYDROCHLORIDE 150 MG/1
150 TABLET, EXTENDED RELEASE ORAL DAILY
Qty: 90 TABLET | Refills: 1 | Status: SHIPPED | OUTPATIENT
Start: 2021-08-11 | End: 2022-02-02

## 2021-08-12 ENCOUNTER — TELEPHONE (OUTPATIENT)
Dept: FAMILY MEDICINE CLINIC | Facility: CLINIC | Age: 71
End: 2021-08-12

## 2021-08-12 NOTE — TELEPHONE ENCOUNTER
[facility] [id number] - [address], [phone]    Diclofenac 1% doesn't come as a cream. It only comes as a jell. Pharmacy is aware [address] if out of the office until tomorrow.

## 2021-10-21 ENCOUNTER — PATIENT MESSAGE (OUTPATIENT)
Dept: FAMILY MEDICINE CLINIC | Facility: CLINIC | Age: 71
End: 2021-10-21

## 2021-10-21 ENCOUNTER — NURSE ONLY (OUTPATIENT)
Dept: LAB | Age: 71
End: 2021-10-21
Attending: INTERNAL MEDICINE
Payer: MEDICARE

## 2021-10-21 DIAGNOSIS — R73.09 ELEVATED HEMOGLOBIN A1C: ICD-10-CM

## 2021-10-21 DIAGNOSIS — D50.8 IRON DEFICIENCY ANEMIA SECONDARY TO INADEQUATE DIETARY IRON INTAKE: ICD-10-CM

## 2021-10-21 PROCEDURE — 36415 COLL VENOUS BLD VENIPUNCTURE: CPT

## 2021-10-21 PROCEDURE — 83540 ASSAY OF IRON: CPT

## 2021-10-21 PROCEDURE — 82728 ASSAY OF FERRITIN: CPT

## 2021-10-21 PROCEDURE — 83036 HEMOGLOBIN GLYCOSYLATED A1C: CPT

## 2021-10-21 PROCEDURE — 83550 IRON BINDING TEST: CPT

## 2021-10-21 PROCEDURE — 85025 COMPLETE CBC W/AUTO DIFF WBC: CPT

## 2021-10-21 NOTE — TELEPHONE ENCOUNTER
From: Josh Stoll  To: Petr Iyer MD  Sent: 10/21/2021 3:59 PM CDT  Subject: Prescription by mail refills    [facility] Dr Jamarcus Gipson,    I am one of your new patients from Dr Zazueta Tabor practice. I look forward to meeting you.  In the meantime

## 2021-10-28 DIAGNOSIS — I77.71 CAROTID ARTERY DISSECTION (HCC): ICD-10-CM

## 2021-10-28 DIAGNOSIS — I51.5 CARDIAC CALCIFICATION (HCC): ICD-10-CM

## 2021-10-28 DIAGNOSIS — E83.119 HEMOCHROMATOSIS, UNSPECIFIED HEMOCHROMATOSIS TYPE: ICD-10-CM

## 2021-10-28 DIAGNOSIS — D12.6 ADENOMATOUS POLYP OF COLON, UNSPECIFIED PART OF COLON: ICD-10-CM

## 2021-10-28 DIAGNOSIS — I72.9 ANEURYSM (HCC): ICD-10-CM

## 2021-10-28 DIAGNOSIS — I10 ESSENTIAL HYPERTENSION: ICD-10-CM

## 2021-10-28 DIAGNOSIS — E78.5 ELEVATED LIPIDS: ICD-10-CM

## 2021-10-28 DIAGNOSIS — R73.9 HYPERGLYCEMIA: ICD-10-CM

## 2021-10-28 DIAGNOSIS — K57.90 DIVERTICULOSIS OF INTESTINE WITHOUT BLEEDING, UNSPECIFIED INTESTINAL TRACT LOCATION: ICD-10-CM

## 2021-10-28 DIAGNOSIS — E04.1 THYROID NODULE: ICD-10-CM

## 2021-10-28 RX ORDER — ROSUVASTATIN CALCIUM 20 MG/1
TABLET, COATED ORAL
Qty: 48 TABLET | Refills: 1 | Status: SHIPPED | OUTPATIENT
Start: 2021-10-28 | End: 2022-02-02

## 2021-10-28 RX ORDER — AMLODIPINE BESYLATE 2.5 MG/1
2.5 TABLET ORAL
Qty: 90 TABLET | Refills: 1 | Status: SHIPPED | OUTPATIENT
Start: 2021-10-28 | End: 2022-02-02

## 2021-10-28 RX ORDER — OLMESARTAN MEDOXOMIL 20 MG/1
20 TABLET ORAL DAILY
Qty: 90 TABLET | Refills: 1 | Status: SHIPPED | OUTPATIENT
Start: 2021-10-28 | End: 2022-02-02

## 2021-10-28 NOTE — TELEPHONE ENCOUNTER
All medications last refilled 2/26/21  Amlodipine #90 with 1 RF  Rosuvastatin #48 with 1 RF  Olmesartan #90 with 1 RF  Last lipid 4/5/21  Routed to PCP to advise    Cholesterol Medication Protocol Failed 10/28/2021 04:01 PM   Protocol Details  ALT < 80

## 2021-10-28 NOTE — TELEPHONE ENCOUNTER
[facility] [facility] MAIL ORDER CALLED AND ADV PT REQUESTING 3 REFILLS OF:     Rosuvastatin Calcium 20 MG Oral Tab    AND    amLODIPine Besylate 2.5 MG Oral Tab    AND    Olmesartan Medoxomil 20 MG Oral Tab    PLEASE SEND TO [facility] [employer] MAILORDER    THANK YOU

## 2022-02-01 ENCOUNTER — PATIENT MESSAGE (OUTPATIENT)
Dept: FAMILY MEDICINE CLINIC | Facility: CLINIC | Age: 72
End: 2022-02-01

## 2022-02-01 NOTE — TELEPHONE ENCOUNTER
From: Nguyen Stoll  To: Petr Villagran MD  Sent: 2/1/2022 4:56 PM CST  Subject: Noe Villagran,     My Medicare Part D Rx provider changed effective 1/1/2022, from [facility] to [facility]. A pdf of my new Rx Drug Plan card is attached. Please send refill prescriptions for a 90-day (or 3 month) supply with refills electronically specified, to [facility], for the following six medications:    Escitalopram Oxalate 10mg tablet - Qty 90  Amlodipine Besylate 2.5mg tablet - Qty 90  Bupropion hcl er (sr) 150mg tablet - Qty 90  Olmeasartan Medoxomil 20mg tablet - Qty 90  Rosuvasatatin Calcium 20mg tablet - Qty 52  Diclofenac Sodium 1% Gel - Qty 1,000g (10 100g tubes)    As I only have Escitalopram Oxalate 10 mg tablets for through this Friday 2/4, please also send a Rx for only Qty 30 of that medication to [facility], in the [employer] located on [address] at [address]     Thank you,  Kina Smith  10/22/1950  [phone]

## 2022-02-02 RX ORDER — OLMESARTAN MEDOXOMIL 20 MG/1
20 TABLET ORAL DAILY
Qty: 90 TABLET | Refills: 0 | Status: SHIPPED | OUTPATIENT
Start: 2022-02-02

## 2022-02-02 RX ORDER — ESCITALOPRAM OXALATE 10 MG/1
10 TABLET ORAL
Qty: 90 TABLET | Refills: 0 | Status: SHIPPED | OUTPATIENT
Start: 2022-02-02 | End: 2022-02-02

## 2022-02-02 RX ORDER — AMLODIPINE BESYLATE 2.5 MG/1
2.5 TABLET ORAL
Qty: 90 TABLET | Refills: 0 | Status: SHIPPED | OUTPATIENT
Start: 2022-02-02

## 2022-02-02 RX ORDER — BUPROPION HYDROCHLORIDE 150 MG/1
150 TABLET, EXTENDED RELEASE ORAL DAILY
Qty: 90 TABLET | Refills: 0 | Status: SHIPPED | OUTPATIENT
Start: 2022-02-02

## 2022-02-02 RX ORDER — ROSUVASTATIN CALCIUM 20 MG/1
TABLET, COATED ORAL
Qty: 48 TABLET | Refills: 0 | Status: SHIPPED | OUTPATIENT
Start: 2022-02-02

## 2022-02-02 RX ORDER — ESCITALOPRAM OXALATE 10 MG/1
10 TABLET ORAL
Qty: 30 TABLET | Refills: 0 | Status: SHIPPED | OUTPATIENT
Start: 2022-02-02

## 2022-02-09 NOTE — TELEPHONE ENCOUNTER
He needs his Rosuvastatin and I am not sure what the pharmacy needs clarification for.  Please inquire and let me know ~ MM

## 2022-02-09 NOTE — TELEPHONE ENCOUNTER
Called pharmacy and they were needing clarification since he has an allergy to Lipitor so they were confirming pt was ok to still take the Rosuvastatin. Told the pharmacy that pt has been on this medication awhile and can tolerate it. Verbally understood, will send medication to pt.

## 2022-03-29 ENCOUNTER — TELEPHONE (OUTPATIENT)
Dept: FAMILY MEDICINE CLINIC | Facility: CLINIC | Age: 72
End: 2022-03-29

## 2022-04-08 NOTE — TELEPHONE ENCOUNTER
Last refilled 2/2/22 for #45 with 0 RF  LOV with [facility] 2/26/21  No future appt with pcp  Last Lipid 4/5/21     Cholesterol Medication Protocol Failed 04/07/2022 11:59 PM   Protocol Details  ALT < 80    ALT resulted within past year    Lipid panel within past 12 months    Appointment within past 12 or next 3 months

## 2022-04-09 RX ORDER — ROSUVASTATIN CALCIUM 20 MG/1
TABLET, COATED ORAL
Qty: 48 TABLET | Refills: 0 | Status: SHIPPED | OUTPATIENT
Start: 2022-04-09

## 2022-04-13 RX ORDER — OLMESARTAN MEDOXOMIL 20 MG/1
TABLET ORAL
Qty: 90 TABLET | Refills: 3 | Status: SHIPPED | OUTPATIENT
Start: 2022-04-13

## 2022-04-13 RX ORDER — ESCITALOPRAM OXALATE 10 MG/1
TABLET ORAL
Qty: 90 TABLET | Refills: 3 | Status: SHIPPED | OUTPATIENT
Start: 2022-04-13

## 2022-04-13 RX ORDER — AMLODIPINE BESYLATE 2.5 MG/1
TABLET ORAL
Qty: 90 TABLET | Refills: 3 | Status: SHIPPED | OUTPATIENT
Start: 2022-04-13

## 2022-04-13 RX ORDER — BUPROPION HYDROCHLORIDE 150 MG/1
TABLET, EXTENDED RELEASE ORAL
Qty: 90 TABLET | Refills: 3 | Status: SHIPPED | OUTPATIENT
Start: 2022-04-13

## 2022-04-13 NOTE — TELEPHONE ENCOUNTER
All medications last refilled 2/2/22  LOV with [facility] 2/26/21  No future appt with pcp  Last CMP 2/7/20 scanned in     Hypertension Medications Protocol Failed 04/12/2022 11:41 PM   Protocol Details  CMP or BMP in past 12 months    Appointment in past 6 or next 3 months    Last serum creatinine< 2.0

## 2022-05-05 ENCOUNTER — PATIENT OUTREACH (OUTPATIENT)
Dept: FAMILY MEDICINE CLINIC | Facility: CLINIC | Age: 72
End: 2022-05-05

## 2022-06-14 RX ORDER — ROSUVASTATIN CALCIUM 20 MG/1
TABLET, COATED ORAL
Qty: 48 TABLET | Refills: 0 | Status: SHIPPED | OUTPATIENT
Start: 2022-06-14

## 2022-06-14 NOTE — TELEPHONE ENCOUNTER
Cholesterol Medication Protocol Failed 06/13/2022 11:27 PM   Protocol Details  ALT < 80    ALT resulted within past year    Lipid panel within past 12 months    Appointment within past 12 or next 3 months     Last lipid completed 4/5/21  Future Appointments   Date Time Provider Jun Crawford   8/10/2022 11:00 AM Petr Pizano MD [facility]   9/2/2022  1:00 PM SHAHEEN Onofre [facility] SUB GI     Protocol failed- routed to provder to address

## 2022-07-05 ENCOUNTER — HOSPITAL ENCOUNTER (OUTPATIENT)
Dept: ULTRASOUND IMAGING | Age: 72
Discharge: HOME | End: 2022-07-05
Attending: NURSE PRACTITIONER
Payer: MEDICARE

## 2022-07-05 DIAGNOSIS — K82.4 GALLBLADDER POLYP: ICD-10-CM

## 2022-07-05 PROCEDURE — 76700 US EXAM ABDOM COMPLETE: CPT | Performed by: NURSE PRACTITIONER

## 2022-08-02 ENCOUNTER — LAB ENCOUNTER (OUTPATIENT)
Dept: LAB | Age: 72
End: 2022-08-02
Attending: INTERNAL MEDICINE
Payer: MEDICARE

## 2022-08-02 DIAGNOSIS — Z01.818 PRE-OP TESTING: ICD-10-CM

## 2022-08-03 LAB — SARS-COV-2 RNA RESP QL NAA+PROBE: NOT DETECTED

## 2022-08-05 PROBLEM — Z86.0101 HISTORY OF ADENOMATOUS POLYP OF COLON: Status: ACTIVE | Noted: 2022-08-05

## 2022-08-05 PROBLEM — Z86.010 HISTORY OF ADENOMATOUS POLYP OF COLON: Status: ACTIVE | Noted: 2022-08-05

## 2022-08-05 PROBLEM — D50.9 IRON DEFICIENCY ANEMIA, UNSPECIFIED: Status: ACTIVE | Noted: 2022-08-05

## 2022-08-05 PROBLEM — R07.9 CHEST PAIN, UNSPECIFIED: Status: ACTIVE | Noted: 2022-08-05

## 2022-08-10 ENCOUNTER — OFFICE VISIT (OUTPATIENT)
Dept: FAMILY MEDICINE CLINIC | Facility: CLINIC | Age: 72
End: 2022-08-10
Payer: MEDICARE

## 2022-08-10 VITALS
DIASTOLIC BLOOD PRESSURE: 80 MMHG | HEART RATE: 53 BPM | BODY MASS INDEX: 27.23 KG/M2 | OXYGEN SATURATION: 95 % | TEMPERATURE: 98 F | SYSTOLIC BLOOD PRESSURE: 122 MMHG | HEIGHT: 67 IN | WEIGHT: 173.5 LBS | RESPIRATION RATE: 16 BRPM

## 2022-08-10 DIAGNOSIS — Z86.010 HISTORY OF ADENOMATOUS POLYP OF COLON: ICD-10-CM

## 2022-08-10 DIAGNOSIS — R73.9 HYPERGLYCEMIA: ICD-10-CM

## 2022-08-10 DIAGNOSIS — E83.110 HEREDITARY HEMOCHROMATOSIS (HCC): ICD-10-CM

## 2022-08-10 DIAGNOSIS — I10 PRIMARY HYPERTENSION: ICD-10-CM

## 2022-08-10 DIAGNOSIS — R73.03 PREDIABETES: ICD-10-CM

## 2022-08-10 DIAGNOSIS — I72.9 ANEURYSM (HCC): ICD-10-CM

## 2022-08-10 DIAGNOSIS — E04.1 THYROID NODULE: ICD-10-CM

## 2022-08-10 DIAGNOSIS — K22.10 ESOPHAGEAL EROSIONS: ICD-10-CM

## 2022-08-10 DIAGNOSIS — Z12.5 SCREENING FOR PROSTATE CANCER: ICD-10-CM

## 2022-08-10 DIAGNOSIS — I51.5 CARDIAC CALCIFICATION (HCC): ICD-10-CM

## 2022-08-10 DIAGNOSIS — Z00.00 ANNUAL PHYSICAL EXAM: Primary | ICD-10-CM

## 2022-08-10 DIAGNOSIS — I77.71 CAROTID ARTERY DISSECTION (HCC): ICD-10-CM

## 2022-08-10 PROBLEM — F33.0 MAJOR DEPRESSIVE DISORDER, RECURRENT, MILD: Status: ACTIVE | Noted: 2022-08-10

## 2022-08-10 PROBLEM — F33.0 MAJOR DEPRESSIVE DISORDER, RECURRENT, MILD (HCC): Status: ACTIVE | Noted: 2022-08-10

## 2022-08-10 LAB
ALBUMIN SERPL-MCNC: 3.9 G/DL (ref 3.4–5)
ALBUMIN/GLOB SERPL: 1.3 {RATIO} (ref 1–2)
ALP LIVER SERPL-CCNC: 54 U/L
ALT SERPL-CCNC: 30 U/L
ANION GAP SERPL CALC-SCNC: 5 MMOL/L (ref 0–18)
AST SERPL-CCNC: 30 U/L (ref 15–37)
BILIRUB SERPL-MCNC: 0.6 MG/DL (ref 0.1–2)
BUN BLD-MCNC: 16 MG/DL (ref 7–18)
CALCIUM BLD-MCNC: 9 MG/DL (ref 8.5–10.1)
CHLORIDE SERPL-SCNC: 107 MMOL/L (ref 98–112)
CHOLEST SERPL-MCNC: 139 MG/DL (ref ?–200)
CO2 SERPL-SCNC: 25 MMOL/L (ref 21–32)
COMPLEXED PSA SERPL-MCNC: 0.62 NG/ML (ref ?–4)
CREAT BLD-MCNC: 0.65 MG/DL
FASTING PATIENT LIPID ANSWER: YES
FASTING STATUS PATIENT QL REPORTED: YES
GFR SERPLBLD BASED ON 1.73 SQ M-ARVRAT: 102 ML/MIN/1.73M2 (ref 60–?)
GLOBULIN PLAS-MCNC: 3 G/DL (ref 2.8–4.4)
GLUCOSE BLD-MCNC: 108 MG/DL (ref 70–99)
HDLC SERPL-MCNC: 65 MG/DL (ref 40–59)
LDLC SERPL CALC-MCNC: 63 MG/DL (ref ?–100)
NONHDLC SERPL-MCNC: 74 MG/DL (ref ?–130)
OSMOLALITY SERPL CALC.SUM OF ELEC: 286 MOSM/KG (ref 275–295)
POTASSIUM SERPL-SCNC: 4 MMOL/L (ref 3.5–5.1)
PROT SERPL-MCNC: 6.9 G/DL (ref 6.4–8.2)
SODIUM SERPL-SCNC: 137 MMOL/L (ref 136–145)
TRIGL SERPL-MCNC: 46 MG/DL (ref 30–149)
TSI SER-ACNC: 0.95 MIU/ML (ref 0.36–3.74)
VLDLC SERPL CALC-MCNC: 7 MG/DL (ref 0–30)

## 2022-08-10 PROCEDURE — 80053 COMPREHEN METABOLIC PANEL: CPT | Performed by: FAMILY MEDICINE

## 2022-08-10 PROCEDURE — G0439 PPPS, SUBSEQ VISIT: HCPCS | Performed by: FAMILY MEDICINE

## 2022-08-10 PROCEDURE — 1125F AMNT PAIN NOTED PAIN PRSNT: CPT | Performed by: FAMILY MEDICINE

## 2022-08-10 PROCEDURE — 84443 ASSAY THYROID STIM HORMONE: CPT | Performed by: FAMILY MEDICINE

## 2022-08-10 PROCEDURE — 80061 LIPID PANEL: CPT | Performed by: FAMILY MEDICINE

## 2022-08-18 ENCOUNTER — HOSPITAL ENCOUNTER (OUTPATIENT)
Dept: ULTRASOUND IMAGING | Age: 72
Discharge: HOME | End: 2022-08-18
Attending: FAMILY MEDICINE
Payer: MEDICARE

## 2022-08-18 DIAGNOSIS — E04.1 THYROID NODULE: ICD-10-CM

## 2022-08-18 PROCEDURE — 76536 US EXAM OF HEAD AND NECK: CPT | Performed by: FAMILY MEDICINE

## 2022-09-06 RX ORDER — ROSUVASTATIN CALCIUM 20 MG/1
TABLET, COATED ORAL
Qty: 48 TABLET | Refills: 3 | Status: SHIPPED | OUTPATIENT
Start: 2022-09-06

## 2022-09-06 NOTE — TELEPHONE ENCOUNTER
Cholesterol Medication Protocol Passed 09/05/2022 11:40 PM   Protocol Details  ALT < 80    ALT resulted within past year    Lipid panel within past 12 months    Appointment within past 12 or next 3 months     Refilled per protocol  ROSUVASTATIN 20 MG Oral Tab  Last refilled on 6/14/22 #48 with 0 rf.   LOV- 8/10/22  Last labs- 8/10/22    Sent to pharmacy

## 2023-01-04 NOTE — TELEPHONE ENCOUNTER
LOV 08/10/22  Last labs 08/10/22  Last refill on 02/02/22, for #720 g, with 0 refills  diclofenac 1 % External Gel     No future appointments. Order(s) pending, please review. Thank you.

## 2023-03-20 DIAGNOSIS — K57.90 DIVERTICULOSIS OF INTESTINE WITHOUT BLEEDING, UNSPECIFIED INTESTINAL TRACT LOCATION: ICD-10-CM

## 2023-03-20 DIAGNOSIS — R73.9 HYPERGLYCEMIA: ICD-10-CM

## 2023-03-20 DIAGNOSIS — I51.5 CARDIAC CALCIFICATION (HCC): ICD-10-CM

## 2023-03-20 DIAGNOSIS — I10 ESSENTIAL HYPERTENSION: ICD-10-CM

## 2023-03-20 DIAGNOSIS — E04.1 THYROID NODULE: ICD-10-CM

## 2023-03-20 DIAGNOSIS — E78.5 ELEVATED LIPIDS: ICD-10-CM

## 2023-03-20 DIAGNOSIS — I72.9 ANEURYSM (HCC): ICD-10-CM

## 2023-03-20 DIAGNOSIS — I77.71 CAROTID ARTERY DISSECTION (HCC): ICD-10-CM

## 2023-03-20 DIAGNOSIS — D12.6 ADENOMATOUS POLYP OF COLON, UNSPECIFIED PART OF COLON: ICD-10-CM

## 2023-03-20 DIAGNOSIS — E83.119 HEMOCHROMATOSIS, UNSPECIFIED HEMOCHROMATOSIS TYPE: ICD-10-CM

## 2023-03-21 NOTE — TELEPHONE ENCOUNTER
LOV: 8/10/22   Last Refill: 4/13/22  Amlodipine  90 3 RF  [id number] 3 RF  Olmesartan  90 3 RF  Buproprion    90 3 RF    No future appointments.

## 2023-03-22 RX ORDER — AMLODIPINE BESYLATE 2.5 MG/1
TABLET ORAL
Qty: 90 TABLET | Refills: 3 | Status: SHIPPED | OUTPATIENT
Start: 2023-03-22

## 2023-03-22 RX ORDER — BUPROPION HYDROCHLORIDE 150 MG/1
TABLET, EXTENDED RELEASE ORAL
Qty: 90 TABLET | Refills: 3 | Status: SHIPPED | OUTPATIENT
Start: 2023-03-22

## 2023-03-22 RX ORDER — ESCITALOPRAM OXALATE 10 MG/1
TABLET ORAL
Qty: 90 TABLET | Refills: 3 | Status: SHIPPED | OUTPATIENT
Start: 2023-03-22

## 2023-03-22 RX ORDER — OLMESARTAN MEDOXOMIL 20 MG/1
TABLET ORAL
Qty: 90 TABLET | Refills: 3 | Status: SHIPPED | OUTPATIENT
Start: 2023-03-22

## 2023-07-05 ENCOUNTER — HOSPITAL ENCOUNTER (OUTPATIENT)
Dept: ULTRASOUND IMAGING | Age: 73
Discharge: HOME | End: 2023-07-05
Attending: NURSE PRACTITIONER
Payer: MEDICARE

## 2023-07-05 DIAGNOSIS — K82.4 GALLBLADDER POLYP: ICD-10-CM

## 2023-07-05 PROCEDURE — 76700 US EXAM ABDOM COMPLETE: CPT | Performed by: NURSE PRACTITIONER

## 2023-07-06 ENCOUNTER — HOSPITAL ENCOUNTER (OUTPATIENT)
Dept: ULTRASOUND IMAGING | Age: 73
Discharge: HOME | End: 2023-07-06
Attending: INTERNAL MEDICINE
Payer: MEDICARE

## 2023-07-06 DIAGNOSIS — I35.0 AORTIC VALVE STENOSIS, ETIOLOGY OF CARDIAC VALVE DISEASE UNSPECIFIED: ICD-10-CM

## 2023-07-06 DIAGNOSIS — I77.71 CAROTID ARTERY DISSECTION (HCC): ICD-10-CM

## 2023-07-06 DIAGNOSIS — I71.9 AORTIC ANEURYSM WITHOUT RUPTURE, UNSPECIFIED PORTION OF AORTA (HCC): ICD-10-CM

## 2023-07-06 PROCEDURE — 93880 EXTRACRANIAL BILAT STUDY: CPT | Performed by: INTERNAL MEDICINE

## 2023-07-06 PROCEDURE — 76770 US EXAM ABDO BACK WALL COMP: CPT | Performed by: INTERNAL MEDICINE

## 2023-08-08 RX ORDER — ROSUVASTATIN CALCIUM 20 MG/1
TABLET, COATED ORAL
Qty: 48 TABLET | Refills: 3 | Status: SHIPPED | OUTPATIENT
Start: 2023-08-08

## 2023-08-08 NOTE — TELEPHONE ENCOUNTER
LOV: 8/10/22  Last Refill:9/6/22  48 3 RF    No future appointments.     Pt is due for CPX and Fasting labs    Medication filled per protocol-  Pt needs call to set up physical, has not been seen in a year

## 2023-08-10 NOTE — TELEPHONE ENCOUNTER
Advised patient of note below. Patient verbalized understanding. No further questions at this time.     Future Appointments   Date Time Provider Jun Yvette   8/17/2023  1:40 PM SHAHEEN Zuniga 50 EM[facility] Chanda

## 2023-08-17 ENCOUNTER — OFFICE VISIT (OUTPATIENT)
Dept: FAMILY MEDICINE CLINIC | Facility: CLINIC | Age: 73
End: 2023-08-17
Payer: MEDICARE

## 2023-08-17 VITALS
DIASTOLIC BLOOD PRESSURE: 70 MMHG | HEART RATE: 65 BPM | SYSTOLIC BLOOD PRESSURE: 116 MMHG | RESPIRATION RATE: 16 BRPM | WEIGHT: 176 LBS | HEIGHT: 67 IN | OXYGEN SATURATION: 97 % | TEMPERATURE: 98 F | BODY MASS INDEX: 27.62 KG/M2

## 2023-08-17 DIAGNOSIS — E04.1 THYROID NODULE: ICD-10-CM

## 2023-08-17 DIAGNOSIS — L82.1 OTHER SEBORRHEIC KERATOSIS: ICD-10-CM

## 2023-08-17 DIAGNOSIS — E78.5 ELEVATED LIPIDS: ICD-10-CM

## 2023-08-17 DIAGNOSIS — F33.0 MAJOR DEPRESSIVE DISORDER, RECURRENT, MILD (HCC): ICD-10-CM

## 2023-08-17 DIAGNOSIS — E83.110 HEREDITARY HEMOCHROMATOSIS (HCC): ICD-10-CM

## 2023-08-17 DIAGNOSIS — I51.5 CARDIAC CALCIFICATION (HCC): ICD-10-CM

## 2023-08-17 DIAGNOSIS — Z13.6 SCREENING FOR CARDIOVASCULAR CONDITION: ICD-10-CM

## 2023-08-17 DIAGNOSIS — K22.70 BARRETT'S ESOPHAGUS WITHOUT DYSPLASIA: ICD-10-CM

## 2023-08-17 DIAGNOSIS — I10 PRIMARY HYPERTENSION: ICD-10-CM

## 2023-08-17 DIAGNOSIS — Z86.010 HISTORY OF ADENOMATOUS POLYP OF COLON: ICD-10-CM

## 2023-08-17 DIAGNOSIS — I72.9 ANEURYSM (HCC): ICD-10-CM

## 2023-08-17 DIAGNOSIS — D48.5 NEOPLASM OF UNCERTAIN BEHAVIOR OF SKIN: ICD-10-CM

## 2023-08-17 DIAGNOSIS — I77.71 CAROTID ARTERY DISSECTION (HCC): ICD-10-CM

## 2023-08-17 DIAGNOSIS — Z12.5 SCREENING FOR PROSTATE CANCER: ICD-10-CM

## 2023-08-17 DIAGNOSIS — K57.90 DIVERTICULOSIS: ICD-10-CM

## 2023-08-17 DIAGNOSIS — Z00.00 ENCOUNTER FOR ANNUAL HEALTH EXAMINATION: Primary | ICD-10-CM

## 2023-08-17 DIAGNOSIS — D50.9 IRON DEFICIENCY ANEMIA, UNSPECIFIED IRON DEFICIENCY ANEMIA TYPE: ICD-10-CM

## 2023-08-17 DIAGNOSIS — R73.9 HYPERGLYCEMIA: ICD-10-CM

## 2023-08-17 DIAGNOSIS — M17.12 ARTHRITIS OF KNEE, LEFT: ICD-10-CM

## 2023-08-17 LAB
BASOPHILS # BLD AUTO: 0.06 X10(3) UL (ref 0–0.2)
BASOPHILS NFR BLD AUTO: 1.4 %
EOSINOPHIL # BLD AUTO: 0.12 X10(3) UL (ref 0–0.7)
EOSINOPHIL NFR BLD AUTO: 2.7 %
ERYTHROCYTE [DISTWIDTH] IN BLOOD BY AUTOMATED COUNT: 13.2 %
HCT VFR BLD AUTO: 46.7 %
HGB BLD-MCNC: 16.3 G/DL
IMM GRANULOCYTES # BLD AUTO: 0.01 X10(3) UL (ref 0–1)
IMM GRANULOCYTES NFR BLD: 0.2 %
LYMPHOCYTES # BLD AUTO: 1.34 X10(3) UL (ref 1–4)
LYMPHOCYTES NFR BLD AUTO: 30.3 %
MCH RBC QN AUTO: 32.7 PG (ref 26–34)
MCHC RBC AUTO-ENTMCNC: 34.9 G/DL (ref 31–37)
MCV RBC AUTO: 93.8 FL
MONOCYTES # BLD AUTO: 0.72 X10(3) UL (ref 0.1–1)
MONOCYTES NFR BLD AUTO: 16.3 %
NEUTROPHILS # BLD AUTO: 2.17 X10 (3) UL (ref 1.5–7.7)
NEUTROPHILS # BLD AUTO: 2.17 X10(3) UL (ref 1.5–7.7)
NEUTROPHILS NFR BLD AUTO: 49.1 %
PLATELET # BLD AUTO: 180 10(3)UL (ref 150–450)
RBC # BLD AUTO: 4.98 X10(6)UL
WBC # BLD AUTO: 4.4 X10(3) UL (ref 4–11)

## 2023-08-17 PROCEDURE — 83036 HEMOGLOBIN GLYCOSYLATED A1C: CPT | Performed by: NURSE PRACTITIONER

## 2023-08-17 PROCEDURE — 1125F AMNT PAIN NOTED PAIN PRSNT: CPT | Performed by: NURSE PRACTITIONER

## 2023-08-17 PROCEDURE — 83540 ASSAY OF IRON: CPT | Performed by: NURSE PRACTITIONER

## 2023-08-17 PROCEDURE — 82728 ASSAY OF FERRITIN: CPT | Performed by: NURSE PRACTITIONER

## 2023-08-17 PROCEDURE — 80061 LIPID PANEL: CPT | Performed by: NURSE PRACTITIONER

## 2023-08-17 PROCEDURE — 84443 ASSAY THYROID STIM HORMONE: CPT | Performed by: NURSE PRACTITIONER

## 2023-08-17 PROCEDURE — 80053 COMPREHEN METABOLIC PANEL: CPT | Performed by: NURSE PRACTITIONER

## 2023-08-17 PROCEDURE — G0439 PPPS, SUBSEQ VISIT: HCPCS | Performed by: NURSE PRACTITIONER

## 2023-08-17 PROCEDURE — 83550 IRON BINDING TEST: CPT | Performed by: NURSE PRACTITIONER

## 2023-08-17 PROCEDURE — 85025 COMPLETE CBC W/AUTO DIFF WBC: CPT | Performed by: NURSE PRACTITIONER

## 2023-08-18 ENCOUNTER — TELEPHONE (OUTPATIENT)
Dept: FAMILY MEDICINE CLINIC | Facility: CLINIC | Age: 73
End: 2023-08-18

## 2023-08-18 LAB
ALBUMIN SERPL-MCNC: 4.1 G/DL (ref 3.4–5)
ALBUMIN/GLOB SERPL: 1.3 {RATIO} (ref 1–2)
ALP LIVER SERPL-CCNC: 53 U/L
ALT SERPL-CCNC: 54 U/L
ANION GAP SERPL CALC-SCNC: 4 MMOL/L (ref 0–18)
AST SERPL-CCNC: 41 U/L (ref 15–37)
BILIRUB SERPL-MCNC: 0.9 MG/DL (ref 0.1–2)
BUN BLD-MCNC: 13 MG/DL (ref 7–18)
CALCIUM BLD-MCNC: 9 MG/DL (ref 8.5–10.1)
CHLORIDE SERPL-SCNC: 105 MMOL/L (ref 98–112)
CHOLEST SERPL-MCNC: 161 MG/DL (ref ?–200)
CO2 SERPL-SCNC: 30 MMOL/L (ref 21–32)
COMPLEXED PSA SERPL-MCNC: 0.8 NG/ML (ref ?–4)
CREAT BLD-MCNC: 0.71 MG/DL
DEPRECATED HBV CORE AB SER IA-ACNC: 34.1 NG/ML
EGFRCR SERPLBLD CKD-EPI 2021: 99 ML/MIN/1.73M2 (ref 60–?)
EST. AVERAGE GLUCOSE BLD GHB EST-MCNC: 114 MG/DL (ref 68–126)
FASTING PATIENT LIPID ANSWER: NO
FASTING STATUS PATIENT QL REPORTED: NO
GLOBULIN PLAS-MCNC: 3.2 G/DL (ref 2.8–4.4)
GLUCOSE BLD-MCNC: 109 MG/DL (ref 70–99)
HBA1C MFR BLD: 5.6 % (ref ?–5.7)
HDLC SERPL-MCNC: 79 MG/DL (ref 40–59)
IRON SATN MFR SERPL: 77 %
IRON SERPL-MCNC: 204 UG/DL
LDLC SERPL CALC-MCNC: 70 MG/DL (ref ?–100)
NONHDLC SERPL-MCNC: 82 MG/DL (ref ?–130)
OSMOLALITY SERPL CALC.SUM OF ELEC: 289 MOSM/KG (ref 275–295)
POTASSIUM SERPL-SCNC: 4.1 MMOL/L (ref 3.5–5.1)
PROT SERPL-MCNC: 7.3 G/DL (ref 6.4–8.2)
SODIUM SERPL-SCNC: 139 MMOL/L (ref 136–145)
TIBC SERPL-MCNC: 264 UG/DL (ref 240–450)
TRANSFERRIN SERPL-MCNC: 177 MG/DL (ref 200–360)
TRIGL SERPL-MCNC: 63 MG/DL (ref 30–149)
TSI SER-ACNC: 0.53 MIU/ML (ref 0.36–3.74)
VLDLC SERPL CALC-MCNC: 10 MG/DL (ref 0–30)

## 2023-08-18 NOTE — TELEPHONE ENCOUNTER
Left message to voicemail (per verbal release form consent, confirmed with identifying message.)  Advised patient to call office back [phone] - or to refer to [facility].       [facility] sent to pt  Notify me if not read by 08/25/23

## 2023-08-18 NOTE — TELEPHONE ENCOUNTER
----- Message from SHAHEEN Galdamez sent at 8/17/2023 10:16 PM CDT -----    SHAHEEN Galdamez  8/18/2023  9:51 AM CDT Back to Top      CBC - blood counts are all stable     CMP - blood sugar slightly elevated, remains at a stable level. Electrolytes, kidney function, liver enzymes and protein levels are also looking pretty normal.  AST (a liver enzyme) is flagged as elevated, might be due to recent alcohol use or fatty meal the night before.      A1C - normal, borderline prediabetic     LIPID - cholesterol levels look good, continue rx as prescribed     IRON STUDIES - abnormal, recommending follow-up with hematologist     PSA - negative prostate cancer screening     TSH - thyroid is functioning normally

## 2023-10-25 ENCOUNTER — HOSPITAL ENCOUNTER (OUTPATIENT)
Dept: ULTRASOUND IMAGING | Facility: HOSPITAL | Age: 73
Discharge: HOME | End: 2023-10-25
Attending: INTERNAL MEDICINE

## 2023-10-25 DIAGNOSIS — R01.1 CARDIAC MURMUR: ICD-10-CM

## 2023-10-25 DIAGNOSIS — I71.9 AORTIC ANEURYSM OF UNSPECIFIED SITE, WITHOUT RUPTURE (HCC): ICD-10-CM

## 2023-10-25 PROCEDURE — 93931 UPPER EXTREMITY STUDY: CPT | Performed by: INTERNAL MEDICINE

## 2023-11-27 ENCOUNTER — MED REC SCAN ONLY (OUTPATIENT)
Dept: FAMILY MEDICINE CLINIC | Facility: CLINIC | Age: 73
End: 2023-11-27

## 2024-02-22 ENCOUNTER — PATIENT MESSAGE (OUTPATIENT)
Dept: FAMILY MEDICINE CLINIC | Facility: CLINIC | Age: 74
End: 2024-02-22

## 2024-02-22 DIAGNOSIS — K57.90 DIVERTICULOSIS OF INTESTINE WITHOUT BLEEDING, UNSPECIFIED INTESTINAL TRACT LOCATION: ICD-10-CM

## 2024-02-22 DIAGNOSIS — I77.71 CAROTID ARTERY DISSECTION (HCC): ICD-10-CM

## 2024-02-22 DIAGNOSIS — E83.119 HEMOCHROMATOSIS, UNSPECIFIED HEMOCHROMATOSIS TYPE: ICD-10-CM

## 2024-02-22 DIAGNOSIS — D12.6 ADENOMATOUS POLYP OF COLON, UNSPECIFIED PART OF COLON: ICD-10-CM

## 2024-02-22 DIAGNOSIS — E78.5 ELEVATED LIPIDS: ICD-10-CM

## 2024-02-22 DIAGNOSIS — R73.9 HYPERGLYCEMIA: ICD-10-CM

## 2024-02-22 DIAGNOSIS — I51.5 CARDIAC CALCIFICATION (HCC): ICD-10-CM

## 2024-02-22 DIAGNOSIS — M17.12 ARTHRITIS OF KNEE, LEFT: Primary | ICD-10-CM

## 2024-02-22 DIAGNOSIS — I10 ESSENTIAL HYPERTENSION: ICD-10-CM

## 2024-02-22 DIAGNOSIS — E04.1 THYROID NODULE: ICD-10-CM

## 2024-02-22 DIAGNOSIS — I72.9 ANEURYSM (HCC): ICD-10-CM

## 2024-02-22 NOTE — TELEPHONE ENCOUNTER
Last OV 8/17/23  Last lab 8/17/23  Last refilled:  3/22/23 amlodipine, bupropion, escitalopram, olmesartan 90 day 3 refills  8/8/23 rosuvastatin #48  3 refills  1/4/23 diclofenac 700 g  3 refill

## 2024-02-22 NOTE — TELEPHONE ENCOUNTER
From: Bruce Sotll  To: Irma Desouza  Sent: 2/22/2024 12:33 AM CST  Subject: 2024 Prescription Preferred Provider change    Otis Solis,    For year 2024, my Medicare Part D insurance carrier changed from [employer] to [employer]. This caused my Rx’s previously sent in 2023 to [employer] Preferred Rx Provider [facility], unable to be refilled in 2024.     For 2024, please send refillable 90 day prescriptions for my five oral Rx’s; rosuvastatin 20 MG Tabs, buPROPion  MG Tb12, escitalopram 10 MG Tabs, olmesartan 20 MG Tabs and amLODIPine 2.5 MG Tabs to:  [id number]  [address] [address] [phone] or [phone]    Please also send a refillable 90 day prescription for my diclofenac 1 % Gel to:   [id number]  [address]  [place] [address] [phone] or [phone]    Thanks very much!    Bruce Stoll

## 2024-02-24 RX ORDER — AMLODIPINE BESYLATE 2.5 MG/1
2.5 TABLET ORAL DAILY
Qty: 90 TABLET | Refills: 3 | Status: SHIPPED | OUTPATIENT
Start: 2024-02-24

## 2024-02-24 RX ORDER — BUPROPION HYDROCHLORIDE 150 MG/1
150 TABLET, EXTENDED RELEASE ORAL DAILY
Qty: 90 TABLET | Refills: 3 | Status: SHIPPED | OUTPATIENT
Start: 2024-02-24

## 2024-02-24 RX ORDER — ROSUVASTATIN CALCIUM 20 MG/1
TABLET, COATED ORAL
Qty: 48 TABLET | Refills: 3 | Status: SHIPPED | OUTPATIENT
Start: 2024-02-24

## 2024-02-24 RX ORDER — OLMESARTAN MEDOXOMIL 20 MG/1
20 TABLET ORAL DAILY
Qty: 90 TABLET | Refills: 3 | Status: SHIPPED | OUTPATIENT
Start: 2024-02-24

## 2024-02-24 RX ORDER — ESCITALOPRAM OXALATE 10 MG/1
10 TABLET ORAL DAILY
Qty: 90 TABLET | Refills: 3 | Status: SHIPPED | OUTPATIENT
Start: 2024-02-24

## 2024-02-27 ENCOUNTER — TELEPHONE (OUTPATIENT)
Dept: FAMILY MEDICINE CLINIC | Facility: CLINIC | Age: 74
End: 2024-02-27

## 2024-03-02 DIAGNOSIS — D12.6 ADENOMATOUS POLYP OF COLON, UNSPECIFIED PART OF COLON: ICD-10-CM

## 2024-03-02 DIAGNOSIS — I10 ESSENTIAL HYPERTENSION: ICD-10-CM

## 2024-03-02 DIAGNOSIS — E04.1 THYROID NODULE: ICD-10-CM

## 2024-03-02 DIAGNOSIS — I72.9 ANEURYSM (HCC): ICD-10-CM

## 2024-03-02 DIAGNOSIS — K57.90 DIVERTICULOSIS OF INTESTINE WITHOUT BLEEDING, UNSPECIFIED INTESTINAL TRACT LOCATION: ICD-10-CM

## 2024-03-02 DIAGNOSIS — E83.119 HEMOCHROMATOSIS, UNSPECIFIED HEMOCHROMATOSIS TYPE: ICD-10-CM

## 2024-03-02 DIAGNOSIS — E78.5 ELEVATED LIPIDS: ICD-10-CM

## 2024-03-02 DIAGNOSIS — I51.5 CARDIAC CALCIFICATION (HCC): ICD-10-CM

## 2024-03-02 DIAGNOSIS — I77.71 CAROTID ARTERY DISSECTION (HCC): ICD-10-CM

## 2024-03-02 DIAGNOSIS — R73.9 HYPERGLYCEMIA: ICD-10-CM

## 2024-03-02 DIAGNOSIS — M17.12 ARTHRITIS OF KNEE, LEFT: ICD-10-CM

## 2024-03-04 RX ORDER — AMLODIPINE BESYLATE 2.5 MG/1
2.5 TABLET ORAL DAILY
Qty: 90 TABLET | Refills: 3 | OUTPATIENT
Start: 2024-03-04

## 2024-03-04 RX ORDER — OLMESARTAN MEDOXOMIL 20 MG/1
20 TABLET ORAL DAILY
Qty: 90 TABLET | Refills: 3 | OUTPATIENT
Start: 2024-03-04

## 2024-03-04 RX ORDER — BUPROPION HYDROCHLORIDE 150 MG/1
150 TABLET, EXTENDED RELEASE ORAL DAILY
Qty: 90 TABLET | Refills: 3 | OUTPATIENT
Start: 2024-03-04

## 2024-03-04 RX ORDER — ROSUVASTATIN CALCIUM 20 MG/1
TABLET, COATED ORAL
Qty: 48 TABLET | Refills: 3 | OUTPATIENT
Start: 2024-03-04

## 2024-03-04 RX ORDER — ESCITALOPRAM OXALATE 10 MG/1
10 TABLET ORAL DAILY
Qty: 90 TABLET | Refills: 3 | OUTPATIENT
Start: 2024-03-04

## 2024-03-04 NOTE — TELEPHONE ENCOUNTER
LOV 8/17/23    Last Refill  all refilled 2/24/24     Labs 8/17/23     Future Appointments   Date Time Provider Department Center   3/18/2024  1:30 PM Bibi Sam APRN [facility] SUB GI

## 2024-03-04 NOTE — TELEPHONE ENCOUNTER
Routing to provider per protocol.   diclofenac 1 % External Gel   Last refilled on 2/24/24 for #350g  with 3 rf.   Last labs 8/17/23.   Last seen on 8/17/23.       Future Appointments   Date Time Provider Department Center   3/18/2024  1:30 PM Bibi Sam APRN [facility]          Thank you.

## 2024-03-15 DIAGNOSIS — E83.119 HEMOCHROMATOSIS, UNSPECIFIED HEMOCHROMATOSIS TYPE: ICD-10-CM

## 2024-03-15 DIAGNOSIS — I77.71 CAROTID ARTERY DISSECTION (HCC): ICD-10-CM

## 2024-03-15 DIAGNOSIS — I72.9 ANEURYSM (HCC): ICD-10-CM

## 2024-03-15 DIAGNOSIS — R73.9 HYPERGLYCEMIA: ICD-10-CM

## 2024-03-15 DIAGNOSIS — E78.5 ELEVATED LIPIDS: ICD-10-CM

## 2024-03-15 DIAGNOSIS — D12.6 ADENOMATOUS POLYP OF COLON, UNSPECIFIED PART OF COLON: ICD-10-CM

## 2024-03-15 DIAGNOSIS — E04.1 THYROID NODULE: ICD-10-CM

## 2024-03-15 DIAGNOSIS — I10 ESSENTIAL HYPERTENSION: ICD-10-CM

## 2024-03-15 DIAGNOSIS — I51.5 CARDIAC CALCIFICATION (HCC): ICD-10-CM

## 2024-03-15 DIAGNOSIS — K57.90 DIVERTICULOSIS OF INTESTINE WITHOUT BLEEDING, UNSPECIFIED INTESTINAL TRACT LOCATION: ICD-10-CM

## 2024-03-15 NOTE — TELEPHONE ENCOUNTER
[facility] pharmacy refill request     Bupropion last refill 2/24/24    Olmesartan last refill 2/24/24    Escitalopram 2/24/24    Amlodipine 2/24/24    LOV 8/17/23

## 2024-03-16 NOTE — TELEPHONE ENCOUNTER
LOV 08/17/23  Last labs 08/17/23  Last refill on 02/24/24, for #90tabs, with 3 refills  escitalopram 10 MG Oral Tab   buPROPion  MG Oral Tablet 12 Hr   Psychiatric Non-Scheduled (Anti-Anxiety) Nuksox15/15/2024 01:53 PM   Protocol Details In person appointment or virtual visit in the past 6 mos or appointment in next 3 mos    Depression Screening completed within the past 12 months     Last refill on 02/24/24, for #90 tabs, with 3 refills  amLODIPine 2.5 MG Oral Tab   olmesartan 20 MG Oral Tab   - Rx sent to [place]     Future Appointments   Date Time Provider Department Center   3/18/2024  1:30 PM Bibi Sam APRN [facility]       Requesting refills for [facility]  Order(s) pending, please review. Thank you.

## 2024-03-18 ENCOUNTER — LAB ENCOUNTER (OUTPATIENT)
Dept: LAB | Age: 74
End: 2024-03-18
Attending: NURSE PRACTITIONER
Payer: MEDICARE

## 2024-03-18 DIAGNOSIS — R74.01 ELEVATED ALT MEASUREMENT: ICD-10-CM

## 2024-03-18 DIAGNOSIS — R74.01 ELEVATED AST (SGOT): ICD-10-CM

## 2024-03-18 DIAGNOSIS — E83.110 HEREDITARY HEMOCHROMATOSIS (HCC): ICD-10-CM

## 2024-03-18 LAB
ALBUMIN SERPL-MCNC: 4.1 G/DL (ref 3.4–5)
ALBUMIN/GLOB SERPL: 1.2 {RATIO} (ref 1–2)
ALP LIVER SERPL-CCNC: 58 U/L
ALT SERPL-CCNC: 36 U/L
ANION GAP SERPL CALC-SCNC: 3 MMOL/L (ref 0–18)
AST SERPL-CCNC: 30 U/L (ref 15–37)
BILIRUB SERPL-MCNC: 0.8 MG/DL (ref 0.1–2)
BUN BLD-MCNC: 13 MG/DL (ref 9–23)
CALCIUM BLD-MCNC: 9.4 MG/DL (ref 8.5–10.1)
CHLORIDE SERPL-SCNC: 106 MMOL/L (ref 98–112)
CO2 SERPL-SCNC: 30 MMOL/L (ref 21–32)
CREAT BLD-MCNC: 0.68 MG/DL
EGFRCR SERPLBLD CKD-EPI 2021: 99 ML/MIN/1.73M2 (ref 60–?)
FASTING STATUS PATIENT QL REPORTED: NO
GLOBULIN PLAS-MCNC: 3.3 G/DL (ref 2.8–4.4)
GLUCOSE BLD-MCNC: 111 MG/DL (ref 70–99)
OSMOLALITY SERPL CALC.SUM OF ELEC: 289 MOSM/KG (ref 275–295)
POTASSIUM SERPL-SCNC: 4.4 MMOL/L (ref 3.5–5.1)
PROT SERPL-MCNC: 7.4 G/DL (ref 6.4–8.2)
SODIUM SERPL-SCNC: 139 MMOL/L (ref 136–145)

## 2024-03-18 PROCEDURE — 80053 COMPREHEN METABOLIC PANEL: CPT

## 2024-03-18 PROCEDURE — 36415 COLL VENOUS BLD VENIPUNCTURE: CPT

## 2024-03-18 RX ORDER — ESCITALOPRAM OXALATE 10 MG/1
10 TABLET ORAL DAILY
Qty: 90 TABLET | Refills: 3 | OUTPATIENT
Start: 2024-03-18

## 2024-03-18 RX ORDER — OLMESARTAN MEDOXOMIL 20 MG/1
20 TABLET ORAL DAILY
Qty: 90 TABLET | Refills: 3 | OUTPATIENT
Start: 2024-03-18

## 2024-03-18 RX ORDER — BUPROPION HYDROCHLORIDE 150 MG/1
150 TABLET, EXTENDED RELEASE ORAL DAILY
Qty: 90 TABLET | Refills: 3 | OUTPATIENT
Start: 2024-03-18

## 2024-03-18 RX ORDER — AMLODIPINE BESYLATE 2.5 MG/1
2.5 TABLET ORAL DAILY
Qty: 90 TABLET | Refills: 3 | OUTPATIENT
Start: 2024-03-18

## 2024-03-21 DIAGNOSIS — I51.5 CARDIAC CALCIFICATION (HCC): ICD-10-CM

## 2024-03-21 DIAGNOSIS — I10 ESSENTIAL HYPERTENSION: ICD-10-CM

## 2024-03-21 DIAGNOSIS — R73.9 HYPERGLYCEMIA: ICD-10-CM

## 2024-03-21 DIAGNOSIS — D12.6 ADENOMATOUS POLYP OF COLON, UNSPECIFIED PART OF COLON: ICD-10-CM

## 2024-03-21 DIAGNOSIS — E78.5 ELEVATED LIPIDS: ICD-10-CM

## 2024-03-21 DIAGNOSIS — I77.71 CAROTID ARTERY DISSECTION (HCC): ICD-10-CM

## 2024-03-21 DIAGNOSIS — E83.119 HEMOCHROMATOSIS, UNSPECIFIED HEMOCHROMATOSIS TYPE: ICD-10-CM

## 2024-03-21 DIAGNOSIS — E04.1 THYROID NODULE: ICD-10-CM

## 2024-03-21 DIAGNOSIS — I72.9 ANEURYSM (HCC): ICD-10-CM

## 2024-03-21 DIAGNOSIS — K57.90 DIVERTICULOSIS OF INTESTINE WITHOUT BLEEDING, UNSPECIFIED INTESTINAL TRACT LOCATION: ICD-10-CM

## 2024-03-21 NOTE — TELEPHONE ENCOUNTER
[facility] refill request     Bupropion     Last refill 2/24/24    Escitalopram     Last refill 2/24/24    Amlodipine     Last refill 2/24/24    Olmesartan     Last refill 2/24/24    LOV 8/17/23

## 2024-03-22 NOTE — TELEPHONE ENCOUNTER
Called patient to ask if he would like these sent to mail order or ignore as they were sent to [place].

## 2024-03-27 RX ORDER — OLMESARTAN MEDOXOMIL 20 MG/1
20 TABLET ORAL DAILY
Qty: 90 TABLET | Refills: 3 | OUTPATIENT
Start: 2024-03-27

## 2024-03-27 RX ORDER — ESCITALOPRAM OXALATE 10 MG/1
10 TABLET ORAL DAILY
Qty: 90 TABLET | Refills: 3 | OUTPATIENT
Start: 2024-03-27

## 2024-03-27 RX ORDER — BUPROPION HYDROCHLORIDE 150 MG/1
150 TABLET, EXTENDED RELEASE ORAL DAILY
Qty: 90 TABLET | Refills: 3 | OUTPATIENT
Start: 2024-03-27

## 2024-03-27 RX ORDER — AMLODIPINE BESYLATE 2.5 MG/1
2.5 TABLET ORAL DAILY
Qty: 90 TABLET | Refills: 3 | OUTPATIENT
Start: 2024-03-27

## 2024-03-29 NOTE — TELEPHONE ENCOUNTER
Can we call [facility] to confirm they received the refills on the following medications:     - amLODIPine 2.5 MG Oral Tab    amLODIPine 2.5 MG Oral Tab  Take 1 tablet (2.5 mg total) by mouth daily. Dispense: 90 tablet, Refills: 3 ordered       02/24/2024 --      - buPROPion  MG Oral Tablet 12 Hr    buPROPion  MG Oral Tablet 12 Hr  Take 1 tablet (150 mg total) by mouth daily. Dispense: 90 tablet, Refills: 3 ordered       02/24/2024      - escitalopram 10 MG Oral Tab  escitalopram 10 MG Oral Tab  Take 1 tablet (10 mg total) by mouth daily. Dispense: 90 tablet, Refills: 3 ordered       02/24/2024      - olmesartan 20 MG Oral Tab  olmesartan 20 MG Oral Tab  Take 1 tablet (20 mg total) by mouth daily. Dispense: 90 tablet, Refills: 3 ordered       02/24/2024

## 2024-03-30 NOTE — TELEPHONE ENCOUNTER
I called the pharmacy to confirm that they did receive the prescription refills,  the patient does not need anymore refills at this time. Thank you.

## 2024-06-27 ENCOUNTER — HOSPITAL ENCOUNTER (OUTPATIENT)
Dept: ULTRASOUND IMAGING | Age: 74
Discharge: HOME | End: 2024-06-27
Attending: NURSE PRACTITIONER
Payer: MEDICARE

## 2024-06-27 DIAGNOSIS — E04.1 THYROID NODULE: ICD-10-CM

## 2024-06-27 DIAGNOSIS — K82.4 GALLBLADDER POLYP: ICD-10-CM

## 2024-06-27 PROCEDURE — 76700 US EXAM ABDOM COMPLETE: CPT | Performed by: NURSE PRACTITIONER

## 2024-06-27 PROCEDURE — 76536 US EXAM OF HEAD AND NECK: CPT | Performed by: NURSE PRACTITIONER

## 2024-06-29 ENCOUNTER — TELEPHONE (OUTPATIENT)
Dept: FAMILY MEDICINE CLINIC | Facility: CLINIC | Age: 74
End: 2024-06-29

## 2024-06-29 DIAGNOSIS — E04.1 THYROID NODULE: Primary | ICD-10-CM

## 2024-06-29 NOTE — TELEPHONE ENCOUNTER
----- Message from Irma Desouza sent at 6/28/2024  9:02 AM CDT -----  Scattered thyroid nodules, study recommends biopsy; however, biopsy completed in 2011.  Recommending to discuss with endocrinology team to see if they'd like to repeat biopsy or continue to monitor.  Last visit with endo appears to be from 2021    Please place referral to endocrinology if patient is in need

## 2024-06-29 NOTE — TELEPHONE ENCOUNTER
Patient's name and  verified   Pended referral for Dr Holguin, sign if appropriate   Call patient after referral signed  Please Advise

## 2024-07-01 NOTE — TELEPHONE ENCOUNTER
Dr. Holguin no longer available    Referral updated to Dr. Wilson  Provider Address Phone   Maryann Wilson,  [address] [address] [phone]     [facility] need to provide referral contact info

## 2024-07-08 ENCOUNTER — TELEPHONE (OUTPATIENT)
Dept: FAMILY MEDICINE CLINIC | Facility: CLINIC | Age: 74
End: 2024-07-08

## 2024-07-25 ENCOUNTER — OFFICE VISIT (OUTPATIENT)
Dept: HEMATOLOGY/ONCOLOGY | Age: 74
End: 2024-07-25
Attending: INTERNAL MEDICINE
Payer: MEDICARE

## 2024-07-25 VITALS
OXYGEN SATURATION: 93 % | RESPIRATION RATE: 18 BRPM | HEIGHT: 67 IN | BODY MASS INDEX: 27.23 KG/M2 | WEIGHT: 173.5 LBS | TEMPERATURE: 97 F | HEART RATE: 59 BPM | SYSTOLIC BLOOD PRESSURE: 133 MMHG | DIASTOLIC BLOOD PRESSURE: 83 MMHG

## 2024-07-25 DIAGNOSIS — R79.0 ABNORMAL IRON SATURATION: ICD-10-CM

## 2024-07-25 DIAGNOSIS — E83.110 HEREDITARY HEMOCHROMATOSIS (HCC): Primary | ICD-10-CM

## 2024-07-25 DIAGNOSIS — D50.9 IRON DEFICIENCY ANEMIA, UNSPECIFIED IRON DEFICIENCY ANEMIA TYPE: ICD-10-CM

## 2024-07-25 PROCEDURE — 99215 OFFICE O/P EST HI 40 MIN: CPT | Performed by: INTERNAL MEDICINE

## 2024-07-25 NOTE — CONSULTS
Cancer Center Report of Consultation    Patient Name: Bruce Stoll   YOB: 1950   Medical Record Number: [id number]   CSN: [id number]   Consulting Physician: Suleman Senior MD  Referring Physician(s): Irma Desouza  Date of Consultation: 7/25/2024     Reason for Consultation:  Bruce Stoll was seen today in the Cancer Center for evaluation and management of iron overload with likely hereditary hemochromatosis.    History of Present Illness:     73 year old saw Dr. Aguero in 4/2021. He was diagnosed with hemochromatosis in 1991. He had a liver biopsy that showed iron overload. He had elevated serum iron. He was treated with phlebotomy. He had repeat liver biopsy in 2013 that was normal. He continued to have blood donation about quarterly. He was seen in 4/2021 with ferritin of 8 and mild  anemia. HE stopped blood donations at that time. He had iron studies in 8/2023. The ferritin was 24 and the iron saturation was 77%. He has had no phlebotomy since that time. He has not had HFE genetic testing.     He has been doing well. He has no fever or sweats. He has no other complaints.    Past Medical History:  Past Medical History:    Abdominal hernia    Multiple inguinal repaired, using mesh.    Abnormal EKG    Abnormal finding    Abnormal UFCT    Acute, but ill-defined, cerebrovascular disease    Torturous left carotid artery dissection.    Aneurysm (HCC)    4/17/2012: iliac artery; 7/16/2009: Peripheral vascular screen showed dilatation , B/L iliacs; questionable    Arthritis    Osteoarthritis. Diclofenac Sodium Topical Gel 1% as needed.    Atypical mole    Dr Chapa Dx as benign, after removal.    Back pain    From strain when lifting, stress, etc.    Belching    During and soon after meals, from swallowing air.    BPH (benign prostatic hyperplasia)    BPH (benign prostatic hypertrophy)    Calculus of kidney    Small non-obstructing left renal calculus.    Chronic cough    Statin Rx  related.    Closed head injury    Easy bruising    Aspirin used as non-Rx blood thinner.    Flatulence/gas pain/belching    Also stool now darker brown than normal & some floating.    Gallbladder stone without cholecystitis or obstruction    Hayfever    Hearing loss    Slight high frequency hearing loss noted when tested.    Heart palpitations    Diagnosed at time of right carotid aneurism repair.    Hemochromatosis    w/ therapeutic phlebotomy treatment    Hemorrhoids    Told present by Dr Lopes, after first colonoscopy    Hiatal hernia    High blood pressure    High cholesterol    History of cardiac murmur    Diagnosed at time of right carotid aneurism repair.    History of chickenpox    History of depression    Post alf related.    History of measles    HTN (hypertension)    Hyperglycemia    Inguinal hernia    hx L inguinal hernia    Left atrial enlargement    Lipid screening    Mitral insufficiency    Mitral regurgitation    3/9/2011: 2D echo did show mild aterioir lead jet, MR    Murmur    MVP (mitral valve prolapse)    Nontoxic uninodular goiter    Occlusion and stenosis of carotid artery    Other and unspecified hyperlipidemia    Pain in joints    Osteoarthritis. Diclofenac Sodium Topical Gel 1% as needed.    Painful swallowing    From swallowing air.    Palpitations    Problems with swallowing    Causing coughing.    PVC's (premature ventricular contractions)    3/2/2011: per EKG    Recurrent UTI    Seasonal allergies    Sensorineural hearing loss    low grade    Stented coronary artery    Multiple stents placed in left carotid artery.    Stress    Stroke due to embolism of left carotid artery (HCC)    L ICA dissection    Syncope    vagal episode    Thyroid nodule    TIA (transient ischemic attack)    Wears glasses    Far sighted.       Past Surgical History:  Past Surgical History:   Procedure Laterality Date    Colonoscopy  01/04/2014    colon polyp, sessile serrated adenoma    Colonoscopy   2013    Colonoscopy   &     Dr Lopes    Egd  2022    Dr Lopes: Dx Mcclendon's esophagus - biopsies benign.    Endovas repair, infrarenl abdom aortic aneurysm/dissect  10/22/2000    Minor, diagnosed at time of right carotid aneurism repair.    Hernia surgery  2013    bilatera inguinal hernia repair with mesh -Dr. Velasco    Ir angiogram cerebral carotid unilateral  2016         Ir stent  2016         Needle biopsy liver      For Hemochromatosis Dx confirmation.    Other surgical history  1993    removal of 1 cyst and 2 moles (benign)    Other surgical history      liver bx    Other surgical history  2011    FNA, thyroid nodules    Other surgical history  1993    removal of 1 cyst and 2 molds (benign)    T&a  /    Tonsillectomy      Sore throats.       Family Medical History:  Family History   Problem Relation Age of Onset    High Blood Pressure Mother     Thyroid Disorder Mother     Other (Other) Mother     Colon Polyps Mother         Dx age approximately age 75.  age 83.    Hypertension Mother          age 83.    Ulcerative Colitis Mother         Dx age approximately age 75.  age 83.    Cancer Father         skin ca, squamous cell    Stroke Father          age 98.    Hypertension Father          age 98.    Heart Attack Father          age 98.    Other (cerebrovascular disease) Sister         s    Hypertension Sister          age 75.    Stroke Sister          age 75.    Hypertension Brother         Living age 63.    Heart Disease Maternal Grandfather         CAD    Hypertension Maternal Grandfather          age approximately 75.    Heart Attack Maternal Grandfather          age approximately 75.    Breast Cancer Paternal Grandmother          age 98.    Hypertension Paternal Grandmother          age 98.    Other (osteoporosis) Other         fam hx        Psychosocial History:  Social History     Socioeconomic History    Marital status:      Spouse name: NOHEMI    Number of children: 0    Years of education: [employer]  ENG    Highest education level: Not on file   Occupational History    Occupation: SALESMAN     Employer: [employer]     Comment: [employer]   SINCE  1985       Occupation: [employer]      AND       TILL  1981     Comment: SOME  EXPOSURE   TO  GRINDING  STEEL  IN  HIGH  SCHOOL   AND  EARLY  COLLEGE   AND  SOME  SOLVENTS   Tobacco Use    Smoking status: Never    Smokeless tobacco: Never   Vaping Use    Vaping status: Never Used   Substance and Sexual Activity    Alcohol use: Not Currently     Alcohol/week: 9.0 standard drinks of alcohol     Comment: Last alcohol on 8/16/2023, with or after dinner.    Drug use: Never    Sexual activity: Never   Other Topics Concern     Service Not Asked    Blood Transfusions Not Asked    Caffeine Concern No     Comment: 6 decaf cups a day/ 1 cup regular a day    Occupational Exposure Not Asked    Hobby Hazards Not Asked    Sleep Concern Not Asked    Stress Concern Yes    Weight Concern No    Special Diet No    Back Care Not Asked    Exercise Yes     Comment: 2 x week    Bike Helmet Not Asked    Seat Belt Yes    Self-Exams Not Asked   Social History Narrative    Not on file     Social Determinants of Health     Financial Resource Strain: Not on file   Food Insecurity: Not on file   Transportation Needs: Not on file   Physical Activity: Inactive (11/18/2020)    Received from [place]    Exercise Vital Sign     Days of Exercise per Week: 0 days     Minutes of Exercise per Session: 0 min   Stress: Not on file   Social Connections: Not on file   Housing Stability: Not on file       Allergies:   Allergies   Allergen Reactions    Ampicillin      Arthralgias    Lipitor [Atorvastatin] Coughing and MYALGIA       Current  Medications:    Current Outpatient Medications:     Omeprazole 40 MG Oral Capsule Delayed Release, Take 1 capsule (40 mg total) by mouth daily. 30 minutes before dinner., Disp: 90 capsule, Rfl: 3    rosuvastatin 20 MG Oral Tab, TAKE 1 TABLET FOUR TIMES A WEEK (NEW DOSE), Disp: 48 tablet, Rfl: 3    amLODIPine 2.5 MG Oral Tab, Take 1 tablet (2.5 mg total) by mouth daily., Disp: 90 tablet, Rfl: 3    buPROPion  MG Oral Tablet 12 Hr, Take 1 tablet (150 mg total) by mouth daily., Disp: 90 tablet, Rfl: 3    escitalopram 10 MG Oral Tab, Take 1 tablet (10 mg total) by mouth daily., Disp: 90 tablet, Rfl: 3    olmesartan 20 MG Oral Tab, Take 1 tablet (20 mg total) by mouth daily., Disp: 90 tablet, Rfl: 3    diclofenac 1 % External Gel, Apply 2 g topically 4 (four) times daily., Disp: 350 g, Rfl: 3    Omega-3 Fatty Acids (OMEGA-3 FISH OIL OR), , Disp: , Rfl:     Calcium Carb-Cholecalciferol (CALCIUM 600+D) 600-800 MG-UNIT Oral Tab, , Disp: , Rfl:     Saw Palmetto 450 MG Oral Cap, , Disp: , Rfl:     acetaminophen 500 MG Oral Tab, Take by mouth 1,000 mg in AM and 500mg as needed. Max 3000 per day, Disp: , Rfl: 0    aspirin 81 MG Oral Tab, Take 1 tablet (81 mg total) by mouth 3 (three) times daily., Disp: , Rfl:     MULTIVITAMIN TAB/CAP, , Disp: , Rfl:     Review of Systems:  Constitutional: Negative for anorexia, fatigue, fevers, chills, night sweats and weight loss.  Eyes: Negative for visual disturbance, irritation and redness.  Respiratory: Negative for cough, hemoptysis, chest pain, or dyspnea.  Cardiovascular: Negative for angina, orthopnea or palpitations.  Gastrointestinal: Negative for nausea, vomiting, change in bowel habits, diarrhea, constipation and abdominal pain.  Integument/breast: Negative for rash, skin lesions, and pruritus.  Hematologic/lymphatic: Negative for easy bruising, bleeding, and lymphadenopathy.  Musculoskeletal: Negative for myalgias, arthralgias, muscle weakness.  Genitourinary: Negative for  dysuria or hematuria  Neurological: Negative for headaches, dizziness, speech problems, gait problems and focal weakness.  Psychiatric: The patient's mood was calm and appropriate for this visit.    The pertinent positives and negatives were described in the HPI and above. All other systems were negative.    Vital Signs:  Height: 170.2 cm (5' 7\") (07/25 1301)  Weight: 78.7 kg (173 lb 8 oz) (07/25 1301)  BSA (Calculated - sq m): 1.9 sq meters (07/25 1301)  Pulse: 59 (07/25 1301)  BP: 133/83 (07/25 1301)  Temp: 97.1 °F (36.2 °C) (07/25 1301)  Do Not Use - Resp Rate: --  SpO2: 93 % (07/25 1301)    Physical Examination:    Constitutional: Patient is alert and oriented x 3, not in acute distress.  HEENT:  Oropharynx is clear. Neck is supple.  Eyes: Anicteric sclera. Pink conjunctiva.  Respiratory: Clear to auscultation and percussion. No rales.  No wheezes.  Cardiovascular: Regular rate and rhythm.   Gastrointestinal: Soft, non tender with good bowel sounds.  Extremities: No edema. No calf tenderness.  Neurological: Grossly intact without focal motor or sensory deficit.  Lymphatics: There is no palpable lymphadenopathy throughout in the cervical, supraclavicular, or axillary regions.    Labs reviewed at this visit:  Lab Results   Component Value Date    WBC 4.4 08/17/2023    RBC 4.98 08/17/2023    HGB 16.3 08/17/2023    HCT 46.7 08/17/2023    MCV 93.8 08/17/2023    MCH 32.7 08/17/2023    MCHC 34.9 08/17/2023    RDW 13.2 08/17/2023    .0 08/17/2023    MPV 10.4 (H) 03/02/2011     Lab Results   Component Value Date     03/18/2024    K 4.4 03/18/2024     03/18/2024    CO2 30.0 03/18/2024    BUN 13 03/18/2024    CREATSERUM 0.68 (L) 03/18/2024     (H) 03/18/2024    CA 9.4 03/18/2024    ALKPHO 58 03/18/2024    ALT 36 03/18/2024    AST 30 03/18/2024    BILT 0.8 03/18/2024    ALB 4.1 03/18/2024    TP 7.4 03/18/2024         Radiologic imaging reviewed at this visit:    US Abd on 6/27/2024:  FINDINGS:     LIVER:  There is mild increased echogenicity of the liver which may represent fatty infiltration or hepatocellular disease.  Please note that this limits sensitivity for a focal hepatic lesion.  BILIARY:  The gallbladder does not appear distended.  Scattered nodular foci most likely represent polyps measuring up to 4 x 3 x 4 mm with smaller polyps measuring up to 3 and 4 mm each.  One polyp may be new.  No evidence of pericholecystic fluid or  gallbladder wall thickening.  Common bile duct diameter is 3 mm.  PANCREAS:  Pancreas is not well seen due to overlying bowel gas.  SPLEEN:  Spleen measures up to 9.4 cm in length.  KIDNEYS:  Normal.  Right kidney measures 12.3 cm.  Left kidney measures 9.9 cm.  AORTA/IVC:  Visualized portions are unremarkable.     Impression   CONCLUSION:  Several rounded foci within the gallbladder most likely represent polyps.  1 May be new measuring up to 4 mm. Given their size continued follow-up with ultrasound is suggested in approximately 6 months.     There is mild increased echogenicity of the liver which may represent fatty infiltration or hepatocellular disease.  Please note that this limits sensitivity for a focal hepatic lesion.       Assessment/Plan:    Iron overload syndrome consistent with hemochromatosis:    The iron saturation last year of 77% is consistent with hemochromatosis. The ferritin is increasing. I will repeat the ferritin and iron studies with next labs. I will also have him get HFE gene testing to confirm the diagnosis. I will contact him at that time to discuss frequency of phlebotomy. He can do this with blood bank. He could then see me yearly for continued management or he can follow with his PCP. I favor following him over the next year to determine frequency of phlebotomy.    Mcclendon's Esophagus:  Esophagitis  Gastritis    Continue GI management. He might have some occult GI blood loss that could lead to less frequent phlebotomy.          Suleman Senior MD

## 2024-07-25 NOTE — PROGRESS NOTES
Patient is here for new consult for anemia. Hereditary hemochromatosis. Patient is a former Dr Aguero patient. Hemochromatosis was managed by his PCP which as retired.  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 Patient denies dyspnea, chest pain or bleeding issus. Patient has history of stroke.     Education Record    Learner:  Patient    Disease / Diagnosis: new consult     Barriers / Limitations:  None   Comments:    Method:  Discussion   Comments:    General Topics:  Medication, Pain, Side effects and symptom management, and Plan of care reviewed   Comments:    Outcome:  Shows understanding   Comments:

## 2024-08-23 ENCOUNTER — OFFICE VISIT (OUTPATIENT)
Dept: FAMILY MEDICINE CLINIC | Facility: CLINIC | Age: 74
End: 2024-08-23
Payer: MEDICARE

## 2024-08-23 VITALS
RESPIRATION RATE: 16 BRPM | BODY MASS INDEX: 26.84 KG/M2 | HEIGHT: 67 IN | OXYGEN SATURATION: 97 % | SYSTOLIC BLOOD PRESSURE: 126 MMHG | WEIGHT: 171 LBS | HEART RATE: 56 BPM | DIASTOLIC BLOOD PRESSURE: 70 MMHG | TEMPERATURE: 98 F

## 2024-08-23 DIAGNOSIS — E78.5 ELEVATED LIPIDS: ICD-10-CM

## 2024-08-23 DIAGNOSIS — K22.70 BARRETT'S ESOPHAGUS WITHOUT DYSPLASIA: ICD-10-CM

## 2024-08-23 DIAGNOSIS — R07.9 CHEST PAIN, UNSPECIFIED TYPE: ICD-10-CM

## 2024-08-23 DIAGNOSIS — F33.0 MAJOR DEPRESSIVE DISORDER, RECURRENT, MILD (HCC): ICD-10-CM

## 2024-08-23 DIAGNOSIS — E83.110 HEREDITARY HEMOCHROMATOSIS (HCC): ICD-10-CM

## 2024-08-23 DIAGNOSIS — Z86.010 HISTORY OF ADENOMATOUS POLYP OF COLON: ICD-10-CM

## 2024-08-23 DIAGNOSIS — R73.9 HYPERGLYCEMIA: ICD-10-CM

## 2024-08-23 DIAGNOSIS — I72.9 ANEURYSM (HCC): ICD-10-CM

## 2024-08-23 DIAGNOSIS — I10 ESSENTIAL HYPERTENSION: ICD-10-CM

## 2024-08-23 DIAGNOSIS — Z00.00 ENCOUNTER FOR ANNUAL HEALTH EXAMINATION: Primary | ICD-10-CM

## 2024-08-23 DIAGNOSIS — I51.5 CARDIAC CALCIFICATION (HCC): ICD-10-CM

## 2024-08-23 DIAGNOSIS — E04.1 THYROID NODULE: ICD-10-CM

## 2024-08-23 DIAGNOSIS — D48.5 NEOPLASM OF UNCERTAIN BEHAVIOR OF SKIN: ICD-10-CM

## 2024-08-23 DIAGNOSIS — I77.71 CAROTID ARTERY DISSECTION (HCC): ICD-10-CM

## 2024-08-23 DIAGNOSIS — M17.12 ARTHRITIS OF KNEE, LEFT: ICD-10-CM

## 2024-08-23 DIAGNOSIS — D50.9 IRON DEFICIENCY ANEMIA, UNSPECIFIED IRON DEFICIENCY ANEMIA TYPE: ICD-10-CM

## 2024-08-23 DIAGNOSIS — L82.1 OTHER SEBORRHEIC KERATOSIS: ICD-10-CM

## 2024-08-23 DIAGNOSIS — K57.90 DIVERTICULOSIS: ICD-10-CM

## 2024-08-23 LAB
ALBUMIN SERPL-MCNC: 4.8 G/DL (ref 3.2–4.8)
ALBUMIN/GLOB SERPL: 2 {RATIO} (ref 1–2)
ALP LIVER SERPL-CCNC: 53 U/L
ALT SERPL-CCNC: 35 U/L
ANION GAP SERPL CALC-SCNC: 3 MMOL/L (ref 0–18)
AST SERPL-CCNC: 35 U/L (ref ?–34)
BASOPHILS # BLD AUTO: 0.05 X10(3) UL (ref 0–0.2)
BASOPHILS NFR BLD AUTO: 1.3 %
BILIRUB SERPL-MCNC: 0.8 MG/DL (ref 0.2–1.1)
BUN BLD-MCNC: 15 MG/DL (ref 9–23)
CALCIUM BLD-MCNC: 9.8 MG/DL (ref 8.7–10.4)
CHLORIDE SERPL-SCNC: 106 MMOL/L (ref 98–112)
CHOLEST SERPL-MCNC: 141 MG/DL (ref ?–200)
CO2 SERPL-SCNC: 31 MMOL/L (ref 21–32)
CREAT BLD-MCNC: 0.7 MG/DL
EGFRCR SERPLBLD CKD-EPI 2021: 99 ML/MIN/1.73M2 (ref 60–?)
EOSINOPHIL # BLD AUTO: 0.11 X10(3) UL (ref 0–0.7)
EOSINOPHIL NFR BLD AUTO: 2.8 %
ERYTHROCYTE [DISTWIDTH] IN BLOOD BY AUTOMATED COUNT: 12.8 %
FASTING PATIENT LIPID ANSWER: YES
FASTING STATUS PATIENT QL REPORTED: YES
GLOBULIN PLAS-MCNC: 2.4 G/DL (ref 2–3.5)
GLUCOSE BLD-MCNC: 103 MG/DL (ref 70–99)
HCT VFR BLD AUTO: 46.6 %
HDLC SERPL-MCNC: 56 MG/DL (ref 40–59)
HGB BLD-MCNC: 16.1 G/DL
IMM GRANULOCYTES # BLD AUTO: 0 X10(3) UL (ref 0–1)
IMM GRANULOCYTES NFR BLD: 0 %
LDLC SERPL CALC-MCNC: 73 MG/DL (ref ?–100)
LYMPHOCYTES # BLD AUTO: 1.51 X10(3) UL (ref 1–4)
LYMPHOCYTES NFR BLD AUTO: 38 %
MCH RBC QN AUTO: 31.2 PG (ref 26–34)
MCHC RBC AUTO-ENTMCNC: 34.5 G/DL (ref 31–37)
MCV RBC AUTO: 90.3 FL
MONOCYTES # BLD AUTO: 0.61 X10(3) UL (ref 0.1–1)
MONOCYTES NFR BLD AUTO: 15.4 %
NEUTROPHILS # BLD AUTO: 1.69 X10 (3) UL (ref 1.5–7.7)
NEUTROPHILS # BLD AUTO: 1.69 X10(3) UL (ref 1.5–7.7)
NEUTROPHILS NFR BLD AUTO: 42.5 %
NONHDLC SERPL-MCNC: 85 MG/DL (ref ?–130)
OSMOLALITY SERPL CALC.SUM OF ELEC: 291 MOSM/KG (ref 275–295)
PLATELET # BLD AUTO: 162 10(3)UL (ref 150–450)
POTASSIUM SERPL-SCNC: 4.4 MMOL/L (ref 3.5–5.1)
PROT SERPL-MCNC: 7.2 G/DL (ref 5.7–8.2)
RBC # BLD AUTO: 5.16 X10(6)UL
SODIUM SERPL-SCNC: 140 MMOL/L (ref 136–145)
T4 FREE SERPL-MCNC: 1.2 NG/DL (ref 0.8–1.7)
TRIGL SERPL-MCNC: 54 MG/DL (ref 30–149)
TSI SER-ACNC: 0.63 MIU/ML (ref 0.55–4.78)
VLDLC SERPL CALC-MCNC: 8 MG/DL (ref 0–30)
WBC # BLD AUTO: 4 X10(3) UL (ref 4–11)

## 2024-08-23 PROCEDURE — 80053 COMPREHEN METABOLIC PANEL: CPT | Performed by: NURSE PRACTITIONER

## 2024-08-23 PROCEDURE — 84439 ASSAY OF FREE THYROXINE: CPT | Performed by: NURSE PRACTITIONER

## 2024-08-23 PROCEDURE — G0439 PPPS, SUBSEQ VISIT: HCPCS | Performed by: NURSE PRACTITIONER

## 2024-08-23 PROCEDURE — 85025 COMPLETE CBC W/AUTO DIFF WBC: CPT | Performed by: NURSE PRACTITIONER

## 2024-08-23 PROCEDURE — 81256 HFE GENE: CPT | Performed by: NURSE PRACTITIONER

## 2024-08-23 PROCEDURE — 84443 ASSAY THYROID STIM HORMONE: CPT | Performed by: NURSE PRACTITIONER

## 2024-08-23 PROCEDURE — 80061 LIPID PANEL: CPT | Performed by: NURSE PRACTITIONER

## 2024-08-23 NOTE — PROGRESS NOTES
Subjective:   Bruce Stoll is a 73 year old male who presents for a Medicare Wellness Visit charge within the last 11 months and Patient may not meet criteria for AWV: Please evaluate for correct coding and scheduled follow up of multiple significant but stable problems.     Nutrition:  Follows a regular diet.  Drinking approximately 66oz of water a day  Physical activity:  Travels with friend to show dogs, gets 10,000 steps a day    Care team includes:   Cardiology - Dr Ivan Toney    High cholesterol, Torturous carotid with stent placement, most recent carotid stent in 2016  Hematology - Dr. Senior  Dermatology - Dr. Chapa  ENT - to establish with ENT for FNA of thyroid nodules, Bruce would like details regarding FNA before scheduling  Thyroid ultrasound 06/27/2024 - Scattered thyroid nodules, study recommends biopsy; however, biopsy completed in 2011.  Recommending to discuss with ENT team to see if they'd like to repeat biopsy or continue to monitor.  Last visit with endo appears to be from 2021  Gastroenterology - Dr. Lopes and SHAHEEN Sam - Coloscopy completed 08/05/2022, plan of repeating every 10 years, EGD every 2 years, due   03/01/2025, abdominal ultrasound ever 6 months    History/Other:   Fall Risk Assessment:   He has been screened for Falls and is low risk.      Cognitive Assessment:   He had a completely normal cognitive assessment - see flowsheet entries     Functional Ability/Status:   Bruce Stoll has a completely normal functional assessment. See flowsheet for details.        Depression Screening (PHQ):  PHQ-2 SCORE: 0  , done 8/21/2024        Advanced Directives:   He does NOT have a Living Will. [Do you have a living will?: No]  He has a Power of  for Health Care on file in [place].  Not discussed      Patient Active Problem List   Diagnosis    Hereditary hemochromatosis (HCC)    Elevated lipids    Hyperglycemia    Aneurysm (HCC)    Thyroid nodule    Cardiac  calcification (HCC)    Diverticulosis    Carotid artery dissection (HCC)    Essential hypertension    Other seborrheic keratosis    Neoplasm of uncertain behavior of skin    Arthritis of knee, left    Atypical chest pain    Iron deficiency anemia, unspecified    History of adenomatous polyp of colon    Major depressive disorder, recurrent, mild (HCC)    Mcclendon's esophagus without dysplasia     Allergies:  He is allergic to ampicillin and lipitor [atorvastatin].    Current Medications:  Outpatient Medications Marked as Taking for the 8/23/24 encounter (Office Visit) with Irma Desouza APRN   Medication Sig    Omeprazole 40 MG Oral Capsule Delayed Release Take 1 capsule (40 mg total) by mouth daily. 30 minutes before dinner.    rosuvastatin 20 MG Oral Tab TAKE 1 TABLET FOUR TIMES A WEEK (NEW DOSE)    amLODIPine 2.5 MG Oral Tab Take 1 tablet (2.5 mg total) by mouth daily.    buPROPion  MG Oral Tablet 12 Hr Take 1 tablet (150 mg total) by mouth daily.    escitalopram 10 MG Oral Tab Take 1 tablet (10 mg total) by mouth daily.    olmesartan 20 MG Oral Tab Take 1 tablet (20 mg total) by mouth daily.    diclofenac 1 % External Gel Apply 2 g topically 4 (four) times daily.    Omega-3 Fatty Acids (OMEGA-3 FISH OIL OR)     Calcium Carb-Cholecalciferol (CALCIUM 600+D) 600-800 MG-UNIT Oral Tab     Saw Palmetto 450 MG Oral Cap     aspirin 81 MG Oral Tab Take 1 tablet (81 mg total) by mouth 3 (three) times daily.    MULTIVITAMIN TAB/CAP        Medical History:  He  has a past medical history of Abdominal hernia (11/06/2013), Abnormal EKG (04/17/2012), Abnormal finding (04/17/2012), Acute, but ill-defined, cerebrovascular disease (11/02/2016), Aneurysm (HCC), Arthritis (1985), Atypical mole (2003), Back pain (1978), Belching (2019), BPH (benign prostatic hyperplasia), BPH (benign prostatic hypertrophy), Calculus of kidney (7/5/2022), Chronic cough (2005), Closed head injury (03/02/2011), Easy bruising (2000),  Flatulence/gas pain/belching (2/2024), Gallbladder stone without cholecystitis or obstruction (09/28/2011), Hayfever, Hearing loss (1/29/2020), Heart palpitations (10/22/2000), Hemochromatosis, Hemorrhoids (2003), Hiatal hernia, High blood pressure, High cholesterol, History of cardiac murmur (10/22/2000), History of chickenpox, History of depression (2018), History of measles, HTN (hypertension), Hyperglycemia, Inguinal hernia, Left atrial enlargement (03/03/2011), Lipid screening (08/23/2011), Mitral insufficiency (03/10/2009), Mitral regurgitation (04/17/2012), Murmur (04/17/2012), MVP (mitral valve prolapse) (04/17/2012), Nontoxic uninodular goiter (11/09/2011), Occlusion and stenosis of carotid artery (04/17/2012), Other and unspecified hyperlipidemia, Pain in joints (1985), Painful swallowing (2019), Palpitations (04/17/2012), Problems with swallowing (2019), PVC's (premature ventricular contractions) (04/17/2012), Recurrent UTI (04/06/2010), Seasonal allergies (08/25/2011), Sensorineural hearing loss (01/25/2007), Stented coronary artery (11/02/2016), Stress (03/02/2011), Stroke due to embolism of left carotid artery (HCC) (11/02/2016), Syncope (03/02/2011), Thyroid nodule (12/27/2011), TIA (transient ischemic attack), and Wears glasses (1995).  Surgical History:  He  has a past surgical history that includes other surgical history (03/1993); t&a (1957/1958); other surgical history (1991); other surgical history (12/27/2011); other surgical history (03/1993); hernia surgery (11/06/2013); colonoscopy (01/04/2014); colonoscopy (12/31/2013); ir angiogram cerebral carotid unilateral (11/03/2016); ir stent (11/03/2016); endovas repair, infrarenl abdom aortic aneurysm/dissect (10/22/2000); colonoscopy (2003 & 2013); needle biopsy liver (1991); tonsillectomy (1958); and egd (8/5/2022).   Family History:  His family history includes Breast Cancer in his paternal grandmother; Cancer in his father; Colon Polyps in his  mother; Heart Attack in his father and maternal grandfather; Heart Disease in his maternal grandfather; High Blood Pressure in his mother; Hypertension in his brother, father, maternal grandfather, mother, paternal grandmother, and sister; Other in his mother; Stroke in his father and sister; Thyroid Disorder in his mother; Ulcerative Colitis in his mother; cerebrovascular disease in his sister; osteoporosis in an other family member.  Social History:  He  reports that he has never smoked. He has never used smokeless tobacco. He reports that he does not currently use alcohol after a past usage of about 9.0 standard drinks of alcohol per week. He reports that he does not use drugs.    Tobacco:  He has never smoked tobacco.    CAGE Alcohol Screen:   CAGE screening score of 0 on 8/21/2024, showing low risk of alcohol abuse.      Patient Care Team:  Peter Logan MD as PCP - General (Family Medicine)  Ivan Toney MD (CARDIOLOGY)  Suman Garay DO as Consulting Physician (NEUROLOGY)  Jaylen Lal MD as Consulting Physician (Radiology, Diagnostic)  Dusty Main, Bibi Bloom APRN (Nurse Practitioner)  Irma Desouza APRN (Nurse Practitioner Family)    Review of Systems  GENERAL:  Denies fever or chills  RESPIRATORY:  Denies difficulty breathing  CARDIAC:  Denies chest pain with exertion  GI:  Denies nausea, vomiting, diarrhea, constipation, or blood in stool  :  Denies blood in urine or painful urination  REPRO:  Denies penile discharge or testicular pain  NEURO:  Denies recent falls   MSKL:  Denies joint stiffness or pain  SKIN:  Denies change in texture of moles   PSYCH: Denies thoughts of self harm or harming others     Objective:   Constitutional:       Appearance: Normal appearance.  Sitting upright on exam table.  Well developed, well nourished, and in no acute distress.  HEENT:      Grossly normal hearing.       Head: Facial features symmetric. Normocephalic and atraumatic.       Right Ear: Canal clear without erythema or drainage.  TM clear and intact, neutral in position.      Left Ear: Canal clear without erythema or drainage.  TM clear and intact, neutral in position.      Nose: Nose normal. Nares patent bilaterally.     Mouth/Throat: Buccal mucosa is moist.  Uvula rises midline.  Posterior pharynx nonerythematous.      Extraocular Movements: Extraocular movements intact.      Conjunctiva/sclera: Conjunctivae normal. Sclera anicteric         Pupils: Pupils are equal, round, and reactive to light.   Neck:     Neck is supple. Trachea is midline.  No lymphadenopathy.  Cardiovascular:      Rate and Rhythm: Normal rate and regular rhythm.      Heart sounds: Normal heart sounds. No murmur heard.  Pulmonary:      Effort: Pulmonary effort is normal.      Breath sounds: Lungs clear throughout.     No cough or wheezing.  Abdominal:      General: Abdomen is nondistended, soft, nontender.  No organomegaly.    Musculoskeletal:         General: Normal range of motion. Shoulders are symmetric in height.  Strength of extremities are equal bilaterally.     Range of motion without limitations.  Skin:     General: Skin is warm and dry.      Coloration: Skin is not jaundiced.      Findings: No bruising or rash. Scar to right side of neck.  Neurological:      General: No focal deficit present. Speech is clear and organized.     Mental Status: Alert and oriented to person, place, and time.      Sensory: Sensation is intact.      Motor: Motor function is intact. Movements are smooth and controlled without ataxia.     Coordination: Coordination is intact. Coordination normal.      Gait: Gait is intact. Gait steady and nonantalgic.      Deep Tendon Reflexes: Reflexes 2+ bilaterally.  Psychiatric:         Mood and Affect: Mood normal.         Behavior: Behavior normal.         Thought Content: Thought content normal.         Judgment: Judgment normal.     /70   Pulse 56   Temp 97.5 °F (36.4 °C)  (Temporal)   Resp 16   Ht 5' 7\" (1.702 m)   Wt 171 lb (77.6 kg)   SpO2 97%   BMI 26.78 kg/m²  Estimated body mass index is 26.78 kg/m² as calculated from the following:    Height as of this encounter: 5' 7\" (1.702 m).    Weight as of this encounter: 171 lb (77.6 kg).    Medicare Hearing Assessment:   Hearing Screening    Time taken: 8/23/2024  2:25 PM  Entry User: Cierra Valderrama CMA  Screening Method: Finger Rub  Finger Rub Result: Pass         Visual Acuity:   Right Eye Visual Acuity: Corrected Right Eye Chart Acuity: 20/20   Left Eye Visual Acuity: Corrected Left Eye Chart Acuity: 20/20   Both Eyes Visual Acuity: Corrected Both Eyes Chart Acuity: 20/20   Able To Tolerate Visual Acuity: Yes        Assessment & Plan:   Bruce Stoll is a 73 year old male who presents for a Medicare Assessment.     1. Encounter for annual health examination (Primary)  Stable  BP controlled  Reinforced healthy lifestyle  2. Aneurysm (HCC)  Abdominal US every 6 months  Stable, continue following with cardiology team:  Dr. Toney  Overview:  ILIAC.  SAW  DR OSORIO   AND  DR MALDONADO  DOES  REG  U/S   LAST  12/2012. FIRST SEEN iliac artery; 7/16/2009:   3. Carotid artery dissection (HCC)  Stable, continue following with cardiology team:  Dr. Toney  4. Cardiac calcification (HCC)  Stable, continue following with cardiology team:  Dr. Toney  Overview:  I 470   -  2019    Orders:  -     Lipid Panel; Future; Expected date: 08/23/2024  -     Comp Metabolic Panel (14); Future; Expected date: 08/23/2024  -     Lipid Panel  -     Comp Metabolic Panel (14)  5. Hereditary hemochromatosis (HCC)  Labs today  Continue to follow with hematology - Dr. Senior  Overview:  BIOPSY  PROVEN.   TREATING  w/ therapeutic phlebotomy treatment  INTERVAL  CHANGED  DECREASE  LATE  2014  -  I  HAD  PT  GET  SECOND  OPINION  -  DR AVELAR   Orders:  -     CBC With Differential With Platelet; Future; Expected date: 08/23/2024  -     Hereditary  Hemochromatosis, DNA Analysis; Future; Expected date: 08/23/2024  -     CBC With Differential With Platelet  -     Hereditary Hemochromatosis, DNA Analysis  6. Major depressive disorder, recurrent, mild (HCC)  Stable  7. Chest pain, unspecified type  Asymptomatic in the past 2 weeks  Continue following with cardiology, Dr. Toney  -     CBC With Differential With Platelet; Future; Expected date: 08/23/2024  -     CBC With Differential With Platelet  8. Elevated lipids  Continue rx as prescribed  Fasting labs today  Continue following with cardiology team, Dr. Toney  Overview:  CHANGE  ATORVASTATIN  FROM  20   TO  40  MG  6/2015   Orders:  -     Lipid Panel; Future; Expected date: 08/23/2024  -     Lipid Panel  9. Essential hypertension  Controlled, continue rx as prescribed  Overview:  >>OVERVIEW FOR HTN (HYPERTENSION) WRITTEN ON 5/16/2013  7:54 PM BY SHAHIDA CALDERON MD    AMLODOPINE  2,5  MG  AND  ACE  20MG     Orders:  -     Comp Metabolic Panel (14); Future; Expected date: 08/23/2024  -     Comp Metabolic Panel (14)  10. Hyperglycemia  Labs today  Overview:  AIC-5.6/102-6/2015.    AIC  6.0  2013    Orders:  -     Comp Metabolic Panel (14); Future; Expected date: 08/23/2024  -     Comp Metabolic Panel (14)  11. Thyroid nodule  Establish with ENT  Overview:    Needs update.  DR FERRARA.   2  RT  LOBE  /  2011  BIOPSY  NO  CANCER  HE  FOLLOWS  REG  IN  OFFICE  U/S/  /  TPO  AND  ANTI-THYROID  ANTIBODIES  NEGATIVE  Orders:  -     TSH and Free T4; Future; Expected date: 08/23/2024  -     TSH and Free T4  12. Diverticulosis  Continue following with gastro team   Dr. Loyd and SHAHEEN Sam  EGD due 2025  Colonoscopy due 2032  Overview:  COLONOSCOPY  2013  DR LOYD   13. History of adenomatous polyp of colon  Continue following with gastro team   Dr. Loyd and SHAHEEN Sam  EGD due 2025  Colonoscopy due 2032  Overview:  >>OVERVIEW FOR COLON POLYP WRITTEN ON 6/12/2015  3:24 PM BY SHAHIDA CALDERON MD DR   CHRISTOPHER Colonoscopy revealed colon polyp, diverticulosis and internal hemorrhoids 12/30/2013. Recommend repeat colonoscopy in 5 years.    14. Arthritis of knee, left  Stable, continue physical activity as tolerated  15. Iron deficiency anemia, unspecified iron deficiency anemia type  Labs today, continue following with hematology team, Dr. Senior  -     CBC With Differential With Platelet; Future; Expected date: 08/23/2024  -     CBC With Differential With Platelet  16. Neoplasm of uncertain behavior of skin  Follow-up with dermatology - Dr. Chapa  17. Other seborrheic keratosis  Follow-up with dermatology - Dr. Chapa  18. Mcclendon's esophagus without dysplasia  Continue rx as prescribed  Continue following with gastro team   Dr. Lopes and SHAHEEN Sam  EGD due 2025  Colonoscopy due 2032    The patient indicates understanding of these issues and agrees to the plan.  Reinforced healthy diet, lifestyle, and exercise.      Return in 6 months (on 2/23/2025) for blood pressure check with PCP (to remain established).     SHAHEEN Rose, 8/23/2024     Supplementary Documentation:   General Health:  In the past six months, have you lost more than 10 pounds without trying?: 2 - No  Has your appetite been poor?: No  Type of Diet: Balanced;Low Salt;Low Carb;Other  How does the patient maintain a good energy level?: Appropriate Exercise;Daily Walks;Stretching  How would you describe your daily physical activity?: Moderate  How would you describe your current health state?: Good  How do you maintain positive mental well-being?: Social Interaction;Visiting Friends  On a scale of 0 to 10, with 0 being no pain and 10 being severe pain, what is your pain level?: 5 - (Moderate)  In the past six months, have you experienced urine leakage?: 0-No  At any time do you feel concerned for the safety/well-being of yourself and/or your children, in your home or elsewhere?: No  Have you had any immunizations at another office  such as Influenza, Hepatitis B, Tetanus, or Pneumococcal?: Yes    Health Maintenance   Topic Date Due    COVID-19 Vaccine (5 - 2023-24 season) 09/01/2023    HTN: BP Follow-Up  04/18/2024    Annual Physical  08/17/2024    Influenza Vaccine (1) 10/01/2024    PSA  08/17/2025    Colorectal Cancer Screening  08/05/2032    Annual Depression Screening  Completed    Fall Risk Screening (Annual)  Completed    Pneumococcal Vaccine: 65+ Years  Completed    Zoster Vaccines  Completed

## 2025-02-03 RX ORDER — ROSUVASTATIN CALCIUM 20 MG/1
TABLET, COATED ORAL
Qty: 48 TABLET | Refills: 0 | Status: SHIPPED | OUTPATIENT
Start: 2025-02-03

## 2025-02-03 NOTE — TELEPHONE ENCOUNTER
Cholesterol Medication Protocol Guzddu3902/02/2025 04:13 AM   Protocol Details ALT < 80    ALT resulted within past year    Lipid panel within past 12 months    In person appointment or virtual visit in the past 12 mos or appointment in next 3 mos    Medication is active on med list

## 2025-02-20 DIAGNOSIS — K57.90 DIVERTICULOSIS OF INTESTINE WITHOUT BLEEDING, UNSPECIFIED INTESTINAL TRACT LOCATION: ICD-10-CM

## 2025-02-20 DIAGNOSIS — E78.5 ELEVATED LIPIDS: ICD-10-CM

## 2025-02-20 DIAGNOSIS — E04.1 THYROID NODULE: ICD-10-CM

## 2025-02-20 DIAGNOSIS — I72.9 ANEURYSM: ICD-10-CM

## 2025-02-20 DIAGNOSIS — I77.71 CAROTID ARTERY DISSECTION (HCC): ICD-10-CM

## 2025-02-20 DIAGNOSIS — R73.9 HYPERGLYCEMIA: ICD-10-CM

## 2025-02-20 DIAGNOSIS — E83.119 HEMOCHROMATOSIS, UNSPECIFIED HEMOCHROMATOSIS TYPE: ICD-10-CM

## 2025-02-20 DIAGNOSIS — I10 ESSENTIAL HYPERTENSION: ICD-10-CM

## 2025-02-20 DIAGNOSIS — I51.5 CARDIAC CALCIFICATION (HCC): ICD-10-CM

## 2025-02-20 DIAGNOSIS — D12.6 ADENOMATOUS POLYP OF COLON, UNSPECIFIED PART OF COLON: ICD-10-CM

## 2025-02-20 RX ORDER — OLMESARTAN MEDOXOMIL 20 MG/1
20 TABLET ORAL DAILY
Qty: 90 TABLET | Refills: 0 | Status: SHIPPED | OUTPATIENT
Start: 2025-02-20

## 2025-02-20 RX ORDER — AMLODIPINE BESYLATE 2.5 MG/1
2.5 TABLET ORAL DAILY
Qty: 90 TABLET | Refills: 0 | Status: SHIPPED | OUTPATIENT
Start: 2025-02-20

## 2025-02-20 NOTE — TELEPHONE ENCOUNTER
Hypertension Medications Protocol Zyzvrc8702/20/2025 04:13 AM   Protocol Details CMP or BMP in past 12 months    Last BP reading less than 140/90    In person appointment or virtual visit in the past 12 mos or appointment in next 3 mos    EGFRCR or GFRNAA > 50    Medication is active on med list          Hypertension Medications Protocol Upbthm0602/20/2025 04:13 AM   Protocol Details CMP or BMP in past 12 months    Last BP reading less than 140/90    In person appointment or virtual visit in the past 12 mos or appointment in next 3 mos    EGFRCR or GFRNAA > 50    Medication is active on med list        Psychiatric Non-Scheduled (Anti-Anxiety) Wnnnvp5502/20/2025 04:13 AM   Protocol Details In person appointment or virtual visit in the past 6 mos or appointment in next 3 mos    Depression Screening completed within the past 12 months    Medication is active on med list   Routing to provider per protocol.   buPROPion  MG Oral Tablet 12 Hr   Last refilled on 2/24/24 for #90  with 3 rf.     Psychiatric Non-Scheduled (Anti-Anxiety) Gbbvry1402/20/2025 04:13 AM   Protocol Details In person appointment or virtual visit in the past 6 mos or appointment in next 3 mos    Depression Screening completed within the past 12 months    Medication is active on med list   Routing to provider per protocol.   escitalopram 10 MG Oral Tab   Last refilled on 2/24/24 for #90  with 3 rf.   Last labs 8/23/24.   Last seen on 8/23/24.       Future Appointments   Date Time Provider Department Center   2/25/2025  9:00 AM [facility] [place]          Thank you.

## 2025-02-21 RX ORDER — ESCITALOPRAM OXALATE 10 MG/1
10 TABLET ORAL DAILY
Qty: 90 TABLET | Refills: 0 | Status: SHIPPED | OUTPATIENT
Start: 2025-02-21

## 2025-02-21 RX ORDER — BUPROPION HYDROCHLORIDE 150 MG/1
150 TABLET, EXTENDED RELEASE ORAL DAILY
Qty: 90 TABLET | Refills: 0 | Status: SHIPPED | OUTPATIENT
Start: 2025-02-21

## 2025-02-25 ENCOUNTER — HOSPITAL ENCOUNTER (OUTPATIENT)
Dept: ULTRASOUND IMAGING | Age: 75
Discharge: HOME | End: 2025-02-25
Attending: NURSE PRACTITIONER
Payer: MEDICARE

## 2025-02-25 DIAGNOSIS — K82.4 GALLBLADDER POLYP: ICD-10-CM

## 2025-02-25 PROCEDURE — 76700 US EXAM ABDOM COMPLETE: CPT | Performed by: NURSE PRACTITIONER

## 2025-02-28 ENCOUNTER — PATIENT OUTREACH (OUTPATIENT)
Dept: FAMILY MEDICINE CLINIC | Facility: CLINIC | Age: 75
End: 2025-02-28

## 2025-04-25 RX ORDER — ROSUVASTATIN CALCIUM 20 MG/1
20 TABLET, COATED ORAL
Qty: 48 TABLET | Refills: 0 | Status: SHIPPED | OUTPATIENT
Start: 2025-04-26

## 2025-04-25 NOTE — TELEPHONE ENCOUNTER
Cholesterol Medication Protocol Lbbfyx7404/25/2025 04:12 AM   Protocol Details ALT < 80    ALT resulted within past year    Lipid panel within past 12 months    In person appointment or virtual visit in the past 12 mos or appointment in next 3 mos    Medication is active on med list

## 2025-04-29 ENCOUNTER — HOSPITAL ENCOUNTER (OUTPATIENT)
Dept: MRI IMAGING | Age: 75
Discharge: HOME | End: 2025-04-29
Attending: NURSE PRACTITIONER
Payer: MEDICARE

## 2025-04-29 DIAGNOSIS — R93.2 ABNORMAL LIVER ULTRASOUND: ICD-10-CM

## 2025-04-29 PROCEDURE — A9575 INJ GADOTERATE MEGLUMI 0.1ML: HCPCS | Performed by: NURSE PRACTITIONER

## 2025-04-29 PROCEDURE — 74183 MRI ABD W/O CNTR FLWD CNTR: CPT | Performed by: NURSE PRACTITIONER

## 2025-04-29 RX ORDER — GADOTERATE MEGLUMINE 376.9 MG/ML
20 INJECTION INTRAVENOUS
Status: COMPLETED | OUTPATIENT
Start: 2025-04-29 | End: 2025-04-29

## 2025-04-29 RX ADMIN — GADOTERATE MEGLUMINE 16 ML: 376.9 INJECTION INTRAVENOUS at 13:25:00

## 2025-07-14 DIAGNOSIS — I51.5 CARDIAC CALCIFICATION (HCC): ICD-10-CM

## 2025-07-14 DIAGNOSIS — E78.5 ELEVATED LIPIDS: Primary | ICD-10-CM

## 2025-07-18 RX ORDER — ROSUVASTATIN CALCIUM 20 MG/1
20 TABLET, COATED ORAL
Qty: 48 TABLET | Refills: 1 | Status: SHIPPED | OUTPATIENT
Start: 2025-07-19

## 2025-07-18 NOTE — TELEPHONE ENCOUNTER
Please review: Medication passes protocol, but unable to refill due to medium/high/very high drug interaction warning copied here:    High  Allergy/Contraindication: rosuvastatinReactions: Coughing, MYALGIA. No reaction type specified. User documented allergy severity: Medium.  Level 2 with ATORVASTATIN (Class: STATINS).    Refill passes per [facility] protocol.

## (undated) DIAGNOSIS — I51.5 CARDIAC CALCIFICATION (HCC): ICD-10-CM

## (undated) DIAGNOSIS — E04.1 THYROID NODULE: ICD-10-CM

## (undated) DIAGNOSIS — K57.90 DIVERTICULOSIS OF INTESTINE WITHOUT BLEEDING, UNSPECIFIED INTESTINAL TRACT LOCATION: ICD-10-CM

## (undated) DIAGNOSIS — I10 ESSENTIAL HYPERTENSION: ICD-10-CM

## (undated) DIAGNOSIS — I77.71 CAROTID ARTERY DISSECTION (HCC): ICD-10-CM

## (undated) DIAGNOSIS — D12.6 ADENOMATOUS POLYP OF COLON, UNSPECIFIED PART OF COLON: ICD-10-CM

## (undated) DIAGNOSIS — E78.5 ELEVATED LIPIDS: ICD-10-CM

## (undated) DIAGNOSIS — I72.9 ANEURYSM (HCC): ICD-10-CM

## (undated) DIAGNOSIS — R73.9 HYPERGLYCEMIA: ICD-10-CM

## (undated) DIAGNOSIS — E83.119 HEMOCHROMATOSIS, UNSPECIFIED HEMOCHROMATOSIS TYPE: ICD-10-CM

## (undated) NOTE — LETTER
03/14/19        [place] [address]      Dear Ru Carbajal,    [facility] records indicate that you have outstanding lab work and or testing that was ordered for you and has not yet been completed:  Orders Placed This Encounter   [place]

## (undated) NOTE — LETTER
12/11/2017    Dear Simin Schroeder,  I had the pleasure of seeing Reyna Tayo today,12/11/2017   , for a 1 year follow-up after emergent revascularization of a dissected occluded left distal cervical internal carotid artery causing left cerebral ischemia in dilatation , B/L iliacs; questionable   • BPH (benign prostatic hyperplasia)    • BPH (benign prostatic hypertrophy)    • Closed head injury 3/2/2011   • Gallbladder stone without cholecystitis or obstruction 9/28/2011   • Hayfever    • Hemochromatosis Comment: removal of 1 cyst and 2 molds (benign)  1957/1958: T&A     Drug use: No          Current Outpatient Prescriptions: AmLODIPine Besylate 2.5 MG Oral Tab Take 1 tablet (2.5 mg total) by mouth daily.  Disp: 90 tablet Rfl: 3   Benazepril HCl 20 MG stated age. He is alert and oriented ×3. His speech is clear and fluent and his recent and remote memory are intact.   HEENT: Head normal. Ears normal. Eyes normal. Nose normal. Throat normal.  Neck/Thyroid: no JVD, adenopathy or bruit, trachea midline  L

## (undated) NOTE — MR AVS SNAPSHOT
[address]  [phone]               Thank you for choosing us for your health care visit with [facility].   We are glad to serve you and happy to provide you with this summary o Take 1,000 mg by mouth daily. Commonly known as:  FISH OIL           Saw Palmetto (Serenoa repens) 1000 MG Caps   Take  by mouth. Vitamin D 2000 UNITS Tabs   Take by mouth.                    Today's Orders     Urine Dip, auto without Micro    C Now link in the [employer]. Enter your [employer] Activation Code exactly as it appears below along with your Zip Code and Date of Birth to complete the sign-up process. If you do not sign up before the expiration date, you must request a new code.     Yaw Bobo

## (undated) NOTE — Clinical Note
3/6/2017    Dear Matt Arizmendi,  I had the pleasure of seeing Mihir Rodriguez today,3/6/2017   , for a 4 month follow-up status post emergent revascularization of a dissected, occluded left distal cervical internal carotid artery, causing left cerebral ischem • Recurrent UTI 4/6/2010   • BPH (benign prostatic hypertrophy)    • Hyperglycemia    • Mitral regurgitation 4/17/2012     3/9/2011: 2D echo did show mild aterioir lead jet, MR   • Syncope 3/2/2011     vagal episode   • Closed head injury 3/2/2011   • PVC' IR STENT  11/3/2016    Comment         Drug Use: No          Current Outpatient Prescriptions:  BENAZEPRIL HCL 20 MG Oral Tab TAKE 1 TABLET BY MOUTH EVERY DAY Disp: 30 tablet Rfl: 6   AMLODIPINE BESYLATE 2.5 MG Oral Tab TAKE 1 TABLET BY MOUTH EVERY DAY Cali David The scar of the right-sided endarterectomy is again noted.   Neck/Thyroid: no JVD, adenopathy or bruit, trachea midline  Lungs: clear, equal bilaterally, no wheezing  Cardiovascular: regular rate and rhythm without murmur  Skin: warm, dry and intact  Neurol obtain carotid Dopplers at that time. Dr. Cali Le, his cardiologist usually follows him with annual or biannual carotid Dopplers. I would like us to get him on a unified schedule so he is not getting more examinations then he needs.   For the next 3 years

## (undated) NOTE — MR AVS SNAPSHOT
[address], [address] [place] [address] [phone]               Thank you for choosing us for your health care visit with Michele Zhao MD.  We are glad to serve you and happy to provide you with this sum ? Please allow the office 48-72 hours to fill the prescription. ? Patient must present photo ID at time of .   If a designated family member will be picking up prescription, office must be given name of individual in advance and they must present a med list.                AmLODIPine Besylate 2.5 MG Tabs   TAKE 1 TABLET BY MOUTH EVERY DAY   Commonly known as:  NORVASC           aspirin 325 MG Tabs   Take 325 mg by mouth daily.            Atorvastatin Calcium 40 MG Tabs   Take 1 tablet (40 mg total) by

## (undated) NOTE — MR AVS SNAPSHOT
[address]  [phone]               Thank you for choosing us for your health care visit with Donney Riedel, MD.  We are glad to serve you and happy to provide you with this Take by mouth 2 (two) times daily. Clopidogrel Bisulfate 75 MG Tabs   TAKE 1 TABLET BY MOUTH EVERY DAY   Commonly known as:  PLAVIX           MULTIVITAMIN TAB/CAP           omega-3 fatty acids 1000 MG Caps   Take 1,000 mg by mouth daily.    Common

## (undated) NOTE — Clinical Note
Can we call ENT Dr. Huynh's group to gather details on how they perform a FNA.  Bruce has had a bad experience in the past and wanted these details before scheduling with ENT specialist for FNA of thyroid nodules.

## (undated) NOTE — LETTER
05/13/20        [place] [address] [id number]      Dear Shae Burnett,    [facility] records indicate that you have outstanding lab work and or testing that was ordered for you and has not yet been completed:  Orders Placed This Encounter

## (undated) NOTE — LETTER
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 Cardiac: Regular rate and rhythm. S1S2    Lungs: Clear to auscultation bilaterally.     Neurologic:   Mental status: Oriented to person, place, and time   Speech: Fluent, no dysar

## (undated) NOTE — LETTER
06/20/18        Maria Del Carmen Doe  [facility] [id number]      Dear Tiana Glover,    [address] [facility] records indicate that you have outstanding lab work and or testing that was ordered for you and has not yet been completed:          [facility] [E]      Fe

## (undated) NOTE — LETTER
04/17/20        Mk Liu  [facility] [id number]      Dear William Miles,    [address] [facility] records indicate that you have outstanding lab work and or testing that was ordered for you and has not yet been completed:  Orders Placed This Encounter